# Patient Record
Sex: FEMALE | Race: WHITE | NOT HISPANIC OR LATINO
[De-identification: names, ages, dates, MRNs, and addresses within clinical notes are randomized per-mention and may not be internally consistent; named-entity substitution may affect disease eponyms.]

---

## 2018-12-02 ENCOUNTER — FORM ENCOUNTER (OUTPATIENT)
Age: 28
End: 2018-12-02

## 2019-08-20 ENCOUNTER — FORM ENCOUNTER (OUTPATIENT)
Age: 29
End: 2019-08-20

## 2019-09-26 PROBLEM — Z00.00 ENCOUNTER FOR PREVENTIVE HEALTH EXAMINATION: Status: ACTIVE | Noted: 2019-09-26

## 2019-09-30 ENCOUNTER — APPOINTMENT (OUTPATIENT)
Dept: ANTEPARTUM | Facility: CLINIC | Age: 29
End: 2019-09-30

## 2019-10-01 ENCOUNTER — LABORATORY RESULT (OUTPATIENT)
Age: 29
End: 2019-10-01

## 2019-10-02 ENCOUNTER — APPOINTMENT (OUTPATIENT)
Dept: MATERNAL FETAL MEDICINE | Facility: CLINIC | Age: 29
End: 2019-10-02
Payer: COMMERCIAL

## 2019-10-02 ENCOUNTER — ASOB RESULT (OUTPATIENT)
Age: 29
End: 2019-10-02

## 2019-10-02 ENCOUNTER — APPOINTMENT (OUTPATIENT)
Dept: ANTEPARTUM | Facility: CLINIC | Age: 29
End: 2019-10-02
Payer: COMMERCIAL

## 2019-10-02 ENCOUNTER — APPOINTMENT (OUTPATIENT)
Dept: ANTEPARTUM | Facility: CLINIC | Age: 29
End: 2019-10-02

## 2019-10-02 PROCEDURE — 36416 COLLJ CAPILLARY BLOOD SPEC: CPT

## 2019-10-02 PROCEDURE — 76813 OB US NUCHAL MEAS 1 GEST: CPT

## 2019-10-02 PROCEDURE — 99241 OFFICE CONSULTATION NEW/ESTAB PATIENT 15 MIN: CPT

## 2019-10-14 LAB
1ST TRIMESTER DATA: NORMAL
ADDENDUM DOC: NORMAL
AFP PNL SERPL: NORMAL
AFP SERPL-ACNC: NORMAL
CLINICAL BIOCHEMIST REVIEW: NORMAL
FREE BETA HCG 1ST TRIMESTER: NORMAL
Lab: NORMAL
NASAL BONE: PRESENT
NOTES NTD: NORMAL
NT: NORMAL
PAPP-A SERPL-ACNC: NORMAL
TRISOMY 18/3: NORMAL

## 2019-10-29 ENCOUNTER — APPOINTMENT (OUTPATIENT)
Dept: ANTEPARTUM | Facility: CLINIC | Age: 29
End: 2019-10-29

## 2019-11-01 LAB
1ST TRIMESTER DATA: NORMAL
2ND TRIMESTER DATA: NORMAL
AFP PNL SERPL: NORMAL
AFP SERPL-ACNC: NORMAL
AFP SERPL-ACNC: NORMAL
B-HCG FREE SERPL-MCNC: NORMAL
CLINICAL BIOCHEMIST REVIEW: NORMAL
FREE BETA HCG 1ST TRIMESTER: NORMAL
INHIBIN A SERPL-MCNC: NORMAL
NASAL BONE: PRESENT
NOTES NTD: NORMAL
NT: NORMAL
PAPP-A SERPL-ACNC: NORMAL
U ESTRIOL SERPL-SCNC: NORMAL

## 2019-11-27 ENCOUNTER — APPOINTMENT (OUTPATIENT)
Dept: ANTEPARTUM | Facility: CLINIC | Age: 29
End: 2019-11-27
Payer: COMMERCIAL

## 2019-11-27 ENCOUNTER — ASOB RESULT (OUTPATIENT)
Age: 29
End: 2019-11-27

## 2019-11-27 PROCEDURE — 76817 TRANSVAGINAL US OBSTETRIC: CPT

## 2019-11-27 PROCEDURE — 76805 OB US >/= 14 WKS SNGL FETUS: CPT

## 2020-02-20 ENCOUNTER — APPOINTMENT (OUTPATIENT)
Dept: ANTEPARTUM | Facility: CLINIC | Age: 30
End: 2020-02-20
Payer: COMMERCIAL

## 2020-02-20 ENCOUNTER — ASOB RESULT (OUTPATIENT)
Age: 30
End: 2020-02-20

## 2020-02-20 PROCEDURE — 76819 FETAL BIOPHYS PROFIL W/O NST: CPT

## 2020-02-20 PROCEDURE — 76816 OB US FOLLOW-UP PER FETUS: CPT

## 2020-03-11 ENCOUNTER — TRANSCRIPTION ENCOUNTER (OUTPATIENT)
Age: 30
End: 2020-03-11

## 2020-03-19 ENCOUNTER — ASOB RESULT (OUTPATIENT)
Age: 30
End: 2020-03-19

## 2020-03-19 ENCOUNTER — APPOINTMENT (OUTPATIENT)
Dept: ANTEPARTUM | Facility: CLINIC | Age: 30
End: 2020-03-19
Payer: COMMERCIAL

## 2020-03-19 PROCEDURE — 76816 OB US FOLLOW-UP PER FETUS: CPT

## 2020-03-19 PROCEDURE — 76819 FETAL BIOPHYS PROFIL W/O NST: CPT

## 2020-08-25 ENCOUNTER — FORM ENCOUNTER (OUTPATIENT)
Age: 30
End: 2020-08-25

## 2021-11-16 ENCOUNTER — NON-APPOINTMENT (OUTPATIENT)
Age: 31
End: 2021-11-16

## 2021-11-16 DIAGNOSIS — Z87.898 PERSONAL HISTORY OF OTHER SPECIFIED CONDITIONS: ICD-10-CM

## 2021-11-16 DIAGNOSIS — N92.5 OTHER SPECIFIED IRREGULAR MENSTRUATION: ICD-10-CM

## 2021-11-16 DIAGNOSIS — Z78.9 OTHER SPECIFIED HEALTH STATUS: ICD-10-CM

## 2021-11-16 DIAGNOSIS — Z80.0 FAMILY HISTORY OF MALIGNANT NEOPLASM OF DIGESTIVE ORGANS: ICD-10-CM

## 2021-11-16 DIAGNOSIS — Z80.3 FAMILY HISTORY OF MALIGNANT NEOPLASM OF BREAST: ICD-10-CM

## 2021-11-17 ENCOUNTER — APPOINTMENT (OUTPATIENT)
Dept: OBGYN | Facility: CLINIC | Age: 31
End: 2021-11-17

## 2021-11-28 ENCOUNTER — LABORATORY RESULT (OUTPATIENT)
Age: 31
End: 2021-11-28

## 2021-11-29 ENCOUNTER — APPOINTMENT (OUTPATIENT)
Dept: OBGYN | Facility: CLINIC | Age: 31
End: 2021-11-29
Payer: COMMERCIAL

## 2021-11-29 ENCOUNTER — LABORATORY RESULT (OUTPATIENT)
Age: 31
End: 2021-11-29

## 2021-11-29 ENCOUNTER — APPOINTMENT (OUTPATIENT)
Dept: OBGYN | Facility: CLINIC | Age: 31
End: 2021-11-29

## 2021-11-29 VITALS
HEIGHT: 66 IN | DIASTOLIC BLOOD PRESSURE: 72 MMHG | SYSTOLIC BLOOD PRESSURE: 114 MMHG | BODY MASS INDEX: 27 KG/M2 | WEIGHT: 168 LBS

## 2021-11-29 PROCEDURE — 76817 TRANSVAGINAL US OBSTETRIC: CPT

## 2021-11-29 PROCEDURE — 99214 OFFICE O/P EST MOD 30 MIN: CPT | Mod: 25

## 2021-11-29 NOTE — PHYSICAL EXAM
[Chaperone Present] : A chaperone was present in the examining room during all aspects of the physical examination [FreeTextEntry1] : SM [Labia Majora] : normal [Labia Minora] : normal [Normal] : normal [Enlarged ___ wks] : enlarged [unfilled] ~Uweeks [Anteversion] : anteverted [Mass ___ cm] : no uterine mass was palpated [Uterine Adnexae] : normal [FreeTextEntry6] : Uterus enlarged 6 weeks size

## 2021-11-29 NOTE — PLAN
[FreeTextEntry1] : Patient will start prenatal vitamin nutrition exercise counseling given will discuss with MFM use of clobetasol during pregnancy for now will use 1% hydrocortisone all risk benefits pros cons discussed with patient in layman's terms questions answered we will start prenatal vitamin and routine blood work has been ordered Pap smear done today she denies morning sickness recommend adequate hydration return in 3 to 4 weeks time Ultra-Screen scheduled return for Ultra-Screen MSAFP on level 2 sonogram as instructed

## 2021-12-01 ENCOUNTER — NON-APPOINTMENT (OUTPATIENT)
Age: 31
End: 2021-12-01

## 2021-12-01 NOTE — HISTORY OF PRESENT ILLNESS
[Patient reported PAP Smear was normal] : Patient reported PAP Smear was normal [HIV test declined] : HIV test declined [Syphilis test declined] : Syphilis test declined [Gonorrhea test declined] : Gonorrhea test declined [Chlamydia test declined] : Chlamydia test declined [Trichomonas test declined] : Trichomonas test declined [HPV test declined] : HPV test declined [Hepatitis B test declined] : Hepatitis B test declined [Hepatitis C test declined] : Hepatitis C test declined [N] : Patient does not use contraception [Y] : Positive pregnancy history [Normal Amount/Duration] :  normal amount and duration [Regular Cycle Intervals] : periods have been regular [HCG+: ___(Date)] : a positive HCG was recorded on [unfilled] [Yes] : Patient has concerns regarding sex [Currently Active] : currently active [Men] : men [Vaginal] : vaginal [No] : No [Patient refuses STI testing] : Patient refuses STI testing [MensesAmount] : MODERATE [TextBox_4] : PT HERE FOR ANNUAL AND TO CONFIRM  PREGNANCY\par LMP:10/15/2021\par GA:6W3D\par JUAN LUIS:07/22/2022 [PapSmeardate] : 08/26/20 [LMPDate] : 10/15/21 [MensesFreq] : 28 [MensesLength] : 4 [PGxTotal] : 1 [[facility]] : 1 [[facility] Corporationiving] : 1 [TextBox_6] : 10/15/21 [TextBox_9] : 13 [TextBox_13] : 28 [TextBox_15] : 4-5 [FreeTextEntry1] : 10/15/21

## 2021-12-01 NOTE — HISTORY OF PRESENT ILLNESS
[Patient reported PAP Smear was normal] : Patient reported PAP Smear was normal [HIV test declined] : HIV test declined [Syphilis test declined] : Syphilis test declined [Gonorrhea test declined] : Gonorrhea test declined [Chlamydia test declined] : Chlamydia test declined [Trichomonas test declined] : Trichomonas test declined [HPV test declined] : HPV test declined [Hepatitis B test declined] : Hepatitis B test declined [Hepatitis C test declined] : Hepatitis C test declined [N] : Patient does not use contraception [Y] : Positive pregnancy history [Normal Amount/Duration] :  normal amount and duration [Regular Cycle Intervals] : periods have been regular [HCG+: ___(Date)] : a positive HCG was recorded on [unfilled] [Yes] : Patient has concerns regarding sex [Currently Active] : currently active [Men] : men [Vaginal] : vaginal [No] : No [Patient refuses STI testing] : Patient refuses STI testing [MensesAmount] : MODERATE [TextBox_4] : PT HERE FOR ANNUAL AND TO CONFIRM  PREGNANCY\par LMP:10/15/2021\par GA:6W3D\par JUAN LUIS:07/22/2022 [PapSmeardate] : 08/26/20 [LMPDate] : 10/15/21 [MensesFreq] : 28 [MensesLength] : 4 [PGxTotal] : 1 [[facility] CenterxFulerm] : 1 [[facility] [TextBox_6] : 10/15/21 [TextBox_9] : 13 [TextBox_13] : 28 [TextBox_15] : 4-5 [FreeTextEntry1] : 10/15/21

## 2021-12-03 LAB — CYTOLOGY CVX/VAG DOC THIN PREP: NORMAL

## 2021-12-13 ENCOUNTER — NON-APPOINTMENT (OUTPATIENT)
Age: 31
End: 2021-12-13

## 2021-12-14 LAB
ABO + RH PNL BLD: NORMAL
APPEARANCE: CLEAR
BASOPHILS # BLD AUTO: 0.06 K/UL
BASOPHILS NFR BLD AUTO: 0.8 %
BILIRUBIN URINE: NEGATIVE
BLOOD URINE: NEGATIVE
COLOR: YELLOW
EOSINOPHIL # BLD AUTO: 0.2 K/UL
EOSINOPHIL NFR BLD AUTO: 2.8 %
GLUCOSE BS SERPL-MCNC: 80 MG/DL
GLUCOSE QUALITATIVE U: NEGATIVE
HBV SURFACE AG SER QL: NONREACTIVE
HCT VFR BLD CALC: 39.6 %
HCV AB SER QL: NONREACTIVE
HCV S/CO RATIO: 0.41 S/CO
HGB A MFR BLD: 97.6 %
HGB A2 MFR BLD: 2.4 %
HGB BLD-MCNC: 11.9 G/DL
HGB FRACT BLD-IMP: NORMAL
IMM GRANULOCYTES NFR BLD AUTO: 0.3 %
KETONES URINE: NEGATIVE
LEUKOCYTE ESTERASE URINE: NEGATIVE
LYMPHOCYTES # BLD AUTO: 1.11 K/UL
LYMPHOCYTES NFR BLD AUTO: 15.5 %
MAN DIFF?: NORMAL
MCHC RBC-ENTMCNC: 27.1 PG
MCHC RBC-ENTMCNC: 30.1 GM/DL
MCV RBC AUTO: 90.2 FL
MEV IGG FLD QL IA: 24.3 AU/ML
MEV IGG+IGM SER-IMP: POSITIVE
MONOCYTES # BLD AUTO: 0.54 K/UL
MONOCYTES NFR BLD AUTO: 7.5 %
NEUTROPHILS # BLD AUTO: 5.25 K/UL
NEUTROPHILS NFR BLD AUTO: 73.1 %
NITRITE URINE: NEGATIVE
PH URINE: 6.5
PLATELET # BLD AUTO: 283 K/UL
PROTEIN URINE: NORMAL
RBC # BLD: 4.39 M/UL
RBC # FLD: 16.1 %
RUBV IGG FLD-ACNC: 16.6 INDEX
RUBV IGG SER-IMP: POSITIVE
SPECIFIC GRAVITY URINE: 1.03
T3FREE SERPL-MCNC: 3.01 PG/ML
T4 FREE SERPL-MCNC: 1.1 NG/DL
TSH SERPL-ACNC: 1.61 UIU/ML
UROBILINOGEN URINE: NORMAL
VZV AB TITR SER: POSITIVE
VZV IGG SER IF-ACNC: 709.9 INDEX
WBC # FLD AUTO: 7.18 K/UL

## 2021-12-15 LAB
RPR SER-TITR: NORMAL
RUBV IGM FLD-ACNC: <20 AU/ML

## 2021-12-16 ENCOUNTER — NON-APPOINTMENT (OUTPATIENT)
Age: 31
End: 2021-12-16

## 2021-12-17 ENCOUNTER — NON-APPOINTMENT (OUTPATIENT)
Age: 31
End: 2021-12-17

## 2021-12-17 LAB
B19V IGG SER QL IA: 6.17 INDEX
B19V IGG+IGM SER-IMP: NORMAL
B19V IGG+IGM SER-IMP: POSITIVE
B19V IGM FLD-ACNC: 0.2 INDEX
B19V IGM SER-ACNC: NEGATIVE
MEV IGM SER QL: 1.14 ISR
VZV IGM SER IF-ACNC: 1.09 INDEX

## 2021-12-20 ENCOUNTER — NON-APPOINTMENT (OUTPATIENT)
Age: 31
End: 2021-12-20

## 2021-12-21 LAB — AR GENE MUT ANL BLD/T: NORMAL

## 2021-12-27 LAB — CFTR MUT TESTED BLD/T: NEGATIVE

## 2021-12-29 ENCOUNTER — ASOB RESULT (OUTPATIENT)
Age: 31
End: 2021-12-29

## 2021-12-29 ENCOUNTER — APPOINTMENT (OUTPATIENT)
Dept: MATERNAL FETAL MEDICINE | Facility: CLINIC | Age: 31
End: 2021-12-29
Payer: COMMERCIAL

## 2021-12-29 ENCOUNTER — NON-APPOINTMENT (OUTPATIENT)
Age: 31
End: 2021-12-29

## 2021-12-29 PROCEDURE — 99202 OFFICE O/P NEW SF 15 MIN: CPT | Mod: 95

## 2021-12-30 ENCOUNTER — NON-APPOINTMENT (OUTPATIENT)
Age: 31
End: 2021-12-30

## 2022-01-03 ENCOUNTER — NON-APPOINTMENT (OUTPATIENT)
Age: 32
End: 2022-01-03

## 2022-01-03 ENCOUNTER — APPOINTMENT (OUTPATIENT)
Dept: OBGYN | Facility: CLINIC | Age: 32
End: 2022-01-03
Payer: COMMERCIAL

## 2022-01-03 LAB
11-BETA-HYDROXYLASE-DEFICIENT CONGENITAL ADRENAL HYPERPLASIA: NEGATIVE
6-PYRUVOYL-TETRAHYDROPTERIN SYNTHASE DEFICIENCY: NEGATIVE
ABCC8-RELATED HYPERINSULINISM: NEGATIVE
ADENOSINE DEAMINASE DEFICIENCY: NEGATIVE
ALPHA THALASSEMIA: NEGATIVE
ALPHA-MANNOSIDOSIS: NEGATIVE
ALSTROM SYNDROME: NEGATIVE
AMT-RELATED GLYCINE ENCEPHALOPATHY: NEGATIVE
ANDERMANN SYNDROME: NEGATIVE
AR GENE MUT ANL BLD/T: NEGATIVE
ARGININEMIA: NEGATIVE
ARGININOSUCCINIC ACIDURIA: NEGATIVE
ARSACS: NEGATIVE
ASPARTYLGLYCOSAMINURIA: NEGATIVE
ATAXIA WITH VITAMIN E DEFICIENCY: NEGATIVE
ATAXIA-TELANGIECTASIA: NEGATIVE
ATP7A-RELATED DISORDERS: NEGATIVE
AUTOSOMAL RECESSIVE OSTEOPETROSIS TYPE 1: NEGATIVE
AUTOSOMAL RECESSIVE POLYCYSTIC KIDNEY DISEASE: NEGATIVE
BARDET-BIEDL SYNDROME, BBS1-RELATED: NEGATIVE
BARDET-BIEDL SYNDROME, BBS10-RELATED: NEGATIVE
BARDET-BIEDL SYNDROME, BBS12-RELATED: POSITIVE
BARDET-BIEDL SYNDROME, BBS2-RELATED: NEGATIVE
BIOTINIDASE DEFICIENCY: NEGATIVE
BLOOM SYNDROME: NEGATIVE
CALPAINOPATHY: NEGATIVE
CANAVAN DISEASE: NEGATIVE
CARBAMOYLPHOSPHATE SYNTHETASE I DEFICIENCY: NEGATIVE
CARNITINE PALMITOYLTRANSFERASE IA DEFICIENCY: NEGATIVE
CARNITINE PALMITOYLTRANSFERASE II DEFICIENCY: NEGATIVE
CARTILAGE-HAIR HYPOPLASIA: NEGATIVE
CEREBROTENDINOUS XANTHOMATOSIS: NEGATIVE
CITRULLINEMIA TYPE 1: NEGATIVE
CLN3-RELATED NEURONAL CEROID LIPOFUSCINOSIS: NEGATIVE
CLN5-RELATED NEURONAL CEROID LIPOFUSCINOSIS: NEGATIVE
CLN6-RELATED NEURONAL CEROID LIPOFUSCINOSIS: NEGATIVE
COHEN SYNDROME: NEGATIVE
COL4A3-RELATED ALPORT SYNDROME: NEGATIVE
COL4A4-RELATED ALPORT SYNDROME: NEGATIVE
CONGENITAL ADRENAL HYPERPLASIA: NEGATIVE
CONGENITAL DISORDER OF GLYCOSYLATION TYPE IA: NEGATIVE
CONGENITAL DISORDER OF GLYCOSYLATION TYPE IB: NEGATIVE
CONGENITAL DISORDER OF GLYCOSYLATION TYPE IC: NEGATIVE
CONGENITAL FINNISH NEPHROSIS: NEGATIVE
COSTEFF OPTIC ATROPHY SYNDROME: NEGATIVE
CYSTIC FIBROSIS: NEGATIVE
CYSTINOSIS: NEGATIVE
D-BIFUNCTIONAL PROTEIN DEFICIENCY: NEGATIVE
DELTA-SARCOGLYCANOPATHY: NEGATIVE
DYS GENE MUT TESTED BLD/T: NEGATIVE
DYSFERLINOPATHY: NEGATIVE
DYSTROPHINOPATHY (INCLUDING DUCHENNE/BECKER MUSCULAR DYSTROP: NEGATIVE
ERCC6-RELATED DISORDERS: NEGATIVE
ERCC8-RELATED DISORDERS: NEGATIVE
EVC-RELATED ELLIS-VAN CREVELD SYNDROME: NEGATIVE
EVC2-RELATED ELLIS-VAN CREVELD SYNDROME: NEGATIVE
FABRY DISEASE: NEGATIVE
FAMILIAL MEDITERRANEAN FEVER: NEGATIVE
FANCONI ANEMIA COMPLEMENTATION GROUP A: NEGATIVE
FANCONI ANEMIA TYPE C: NEGATIVE
FKRP-RELATED DISORDERS: NEGATIVE
FRAGILE X SYNDROME: NEGATIVE
GALACTOKINASE DEFICIENCY: NEGATIVE
GALACTOSEMIA: NEGATIVE
GAMMA-SARCOGLYCANOPATHY: NEGATIVE
GAUCHER DISEASE: NEGATIVE
GJB2-RELATED DFNB1 NONSYNDROMIC HEARING LOSS AND DEAFNESS: NEGATIVE
GLB1-RELATED DISORDERS: NEGATIVE
GLDC-RELATED GLYCINE ENCEPHALOPATHY: NEGATIVE
GLUTARIC ACIDEMIA TYPE 1: NEGATIVE
GLYCOGEN STORAGE DISEASE TYPE IA: NEGATIVE
GLYCOGEN STORAGE DISEASE TYPE IB: NEGATIVE
GLYCOGEN STORAGE DISEASE TYPE III: NEGATIVE
GNPTAB-RELATED DISORDERS: NEGATIVE
GRACILE SYNDROME: NEGATIVE
HB BETA CHAIN-RELATED HEMOGLOBINOPATHY: NEGATIVE
HEREDITARY FRUCTOSE INTOLERANCE: NEGATIVE
HERLITZ JUNCTIONAL EPIDERMOLYSIS BULLOSA, LAMA3-RELATED: NEGATIVE
HERLITZ JUNCTIONAL EPIDERMOLYSIS BULLOSA, LAMB3-RELATED: NEGATIVE
HERLITZ JUNCTIONAL EPIDERMOLYSIS BULLOSA, LAMC2-RELATED: NEGATIVE
HEXOSAMINIDASE A DEFICIENCY: NEGATIVE
HMG-COA LYASE DEFICIENCY: NEGATIVE
HOLOCARBOXYLASE SYNTHETASE DEFICIENCY: NEGATIVE
HOMOCYSTINURIA / CYSTATHIONINE BETA-SYNTHASE DEFICIENCY: NEGATIVE
HURLER SYNDROME: NEGATIVE
HYDROLETHALUS SYNDROME: NEGATIVE
HYPOPHOSPHATASIA, AUTOSOMAL RECESSIVE: NEGATIVE
INCLUSION BODY MYOPATHY 2: NEGATIVE
ISOVALERIC ACIDEMIA: NEGATIVE
JOUBERT SYNDROME 2: NEGATIVE
KCNJ11-RELATED FAMILIAL HYPERINSULINISM: NEGATIVE
KRABBE DISEASE: NEGATIVE
LAMA2-RELATED MUSCULAR DYSTROPHY: NEGATIVE
LEIGH SYNDROME, FRENCH-CANADIAN TYPE: NEGATIVE
LIMB-GIRDLE MUSCULAR DYSTROPHY TYPE 2D: NEGATIVE
LIMB-GIRDLE MUSCULAR DYSTROPHY TYPE 2E: NEGATIVE
LIPOAMIDE DEHYDROGENASE DEFICIENCY: NEGATIVE
LIPOID CONGENITAL ADRENAL HYPERPLASIA: NEGATIVE
LONG CHAIN 3-HYDROXYACYL-COA DEHYDROGENASE DEFICIENCY: NEGATIVE
LYSOSOMAL ACID LIPASE DEFICIENCY: NEGATIVE
MAPLE SYRUP URINE DISEASE TYPE 1B: NEGATIVE
MAPLE SYRUP URINE DISEASE TYPE IA: NEGATIVE
MAPLE SYRUP URINE DISEASE TYPE II: NEGATIVE
MEDIUM CHAIN ACYL-COA DEHYDROGENASE DEFICIENCY: NEGATIVE
MEGALENCEPHALIC LEUKOENCEPHALOPATHY WITH SUBCORTICAL CYSTS: NEGATIVE
METACHROMATIC LEUKODYSTROPHY: NEGATIVE
METHYLMALONIC ACIDEMIA, CBLA TYPE: NEGATIVE
METHYLMALONIC ACIDEMIA, CBLB TYPE: NEGATIVE
METHYLMALONIC ACIDURIA AND HOMOCYSTINURIA, CBLC TYPE: NEGATIVE
MKS1-RELATED DISORDERS: NEGATIVE
MUCOLIPIDOSIS III GAMMA: NEGATIVE
MUCOLIPIDOSIS IV: NEGATIVE
MUCOPOLYSACCHARIDOSIS TYPE II: NEGATIVE
MUCOPOLYSACCHARIDOSIS TYPE IIIA: NEGATIVE
MUCOPOLYSACCHARIDOSIS TYPE IIIB: NEGATIVE
MUCOPOLYSACCHARIDOSIS TYPE IIIC: NEGATIVE
MUSCLE-EYE-BRAIN DISEASE: NEGATIVE
MUT-RELATED METHYLMALONIC ACIDEMIA: NEGATIVE
MYO7A-RELATED DISORDERS: NEGATIVE
NEB-RELATED NEMALINE MYOPATHY: NEGATIVE
NIEMANN-PICK DISEASE TYPE C2: NEGATIVE
NIEMANN-PICK DISEASE TYPE C: NEGATIVE
NIEMANN-PICK DISEASE, SMPD1-ASSOCIATED: NEGATIVE
NIJMEGEN BREAKAGE SYNDROME: NEGATIVE
NORTHERN EPILEPSY: NEGATIVE
ORNITHINE TRANSCARBAMYLASE DEFICIENCY: NEGATIVE
PCCA-RELATED PROPIONIC ACIDEMIA: NEGATIVE
PCCB-RELATED PROPIONIC ACIDEMIA: NEGATIVE
PENDRED SYNDROME: NEGATIVE
PEROXISOME BIOGENESIS DISORDER TYPE 3: NEGATIVE
PEROXISOME BIOGENESIS DISORDER TYPE 4: NEGATIVE
PEROXISOME BIOGENESIS DISORDER TYPE 5: NEGATIVE
PEROXISOME BIOGENESIS DISORDER TYPE 6: NEGATIVE
PEX1-RELATED ZELLWEGER SYNDROME SPECTRUM: NEGATIVE
PHENYLALANINE HYDROXYLASE DEFICIENCY: NEGATIVE
POLYGLANDULAR AUTOIMMUNE SYNDROME TYPE 1: NEGATIVE
POMPE DISEASE: NEGATIVE
PPT1-RELATED NEURONAL CEROID LIPOFUSCINOSIS: NEGATIVE
PRIMARY CARNITINE DEFICIENCY: NEGATIVE
PRIMARY HYPEROXALURIA TYPE 1: NEGATIVE
PRIMARY HYPEROXALURIA TYPE 2: NEGATIVE
PRIMARY HYPEROXALURIA TYPE 3: NEGATIVE
PROP1-RELATED COMBINED PITUITARY HORMONE DEFICIENCY: NEGATIVE
PYCNODYSOSTOSIS: NEGATIVE
PYRUVATE CARBOXYLASE DEFICIENCY: NEGATIVE
RHIZOMELIC CHONDRODYSPLASIA PUNCTATA TYPE 1: NEGATIVE
RTEL1-RELATED DISORDERS: NEGATIVE
SALLA DISEASE: NEGATIVE
SANDHOFF DISEASE: NEGATIVE
SEGAWA SYNDROME: NEGATIVE
SHORT CHAIN ACYL-COA DEHYDROGENASE DEFICIENCY: NEGATIVE
SJOGREN-LARSSON SYNDROME: NEGATIVE
SMITH-LEMLI-OPITZ SYNDROME: NEGATIVE
SPASTIC PARAPLEGIA TYPE 15: NEGATIVE
SPONDYLOTHORACIC DYSOSTOSIS: NEGATIVE
STEROID-RESISTANT NEPHROTIC SYNDROME: NEGATIVE
SULFATE TRANSPORTER-RELATED OSTEOCHONDRODYSPLASIA: NEGATIVE
TGM1-RELATED AUTOSOMAL RECESSIVE CONGENITAL ICHTHYOSIS: NEGATIVE
TPP1-RELATED NEURONAL CEROID LIPOFUSCINOSIS: NEGATIVE
TYROSINEMIA TYPE I: NEGATIVE
TYROSINEMIA TYPE II: NEGATIVE
USH1C-RELATED DISORDERS: NEGATIVE
USH2A-RELATED DISORDERS: NEGATIVE
USHER SYNDROME TYPE 1F: NEGATIVE
USHER SYNDROME TYPE 3: NEGATIVE
VERY LONG CHAIN ACYL-COA DEHYDROGENASE DEFICIENCY: NEGATIVE
WALKER-WARBURG SYNDROME: NEGATIVE
WILSON DISEASE: NEGATIVE
X-LINKED ADRENOLEUKODYSTROPHY: NEGATIVE
X-LINKED ALPORT SYNDROME: NEGATIVE
X-LINKED CONGENITAL ADRENAL HYPOPLASIA: NEGATIVE
X-LINKED JUVENILE RETINOSCHISIS: NEGATIVE
X-LINKED MYOTUBULAR MYOPATHY: NEGATIVE
X-LINKED SEVERE COMBINED IMMUNODEFICIENCY: NEGATIVE
XERODERMA PIGMENTOSUM GROUP A: NEGATIVE
XERODERMA PIGMENTOSUM GROUP C: NEGATIVE

## 2022-01-03 PROCEDURE — 0501F PRENATAL FLOW SHEET: CPT

## 2022-01-03 RX ORDER — PRENATAL VIT 49/IRON FUM/FOLIC 6.75-0.2MG
TABLET ORAL
Refills: 0 | Status: ACTIVE | COMMUNITY

## 2022-01-12 ENCOUNTER — ASOB RESULT (OUTPATIENT)
Age: 32
End: 2022-01-12

## 2022-01-12 ENCOUNTER — APPOINTMENT (OUTPATIENT)
Dept: ANTEPARTUM | Facility: CLINIC | Age: 32
End: 2022-01-12
Payer: COMMERCIAL

## 2022-01-12 ENCOUNTER — NON-APPOINTMENT (OUTPATIENT)
Age: 32
End: 2022-01-12

## 2022-01-12 PROCEDURE — 36416 COLLJ CAPILLARY BLOOD SPEC: CPT

## 2022-01-12 PROCEDURE — ZZZZZ: CPT

## 2022-01-12 PROCEDURE — 76813 OB US NUCHAL MEAS 1 GEST: CPT

## 2022-01-17 LAB
1ST TRIMESTER DATA: NORMAL
ADDENDUM DOC: NORMAL
AFP PNL SERPL: NORMAL
AFP SERPL-ACNC: NORMAL
CLINICAL BIOCHEMIST REVIEW: NORMAL
FREE BETA HCG 1ST TRIMESTER: NORMAL
Lab: NORMAL
NOTES NTD: NORMAL
NT: NORMAL
PAPP-A SERPL-ACNC: NORMAL
TRISOMY 18/3: NORMAL

## 2022-02-01 ENCOUNTER — NON-APPOINTMENT (OUTPATIENT)
Age: 32
End: 2022-02-01

## 2022-02-02 ENCOUNTER — APPOINTMENT (OUTPATIENT)
Dept: OBGYN | Facility: CLINIC | Age: 32
End: 2022-02-02
Payer: COMMERCIAL

## 2022-02-02 ENCOUNTER — NON-APPOINTMENT (OUTPATIENT)
Age: 32
End: 2022-02-02

## 2022-02-02 PROCEDURE — 0502F SUBSEQUENT PRENATAL CARE: CPT

## 2022-02-08 ENCOUNTER — NON-APPOINTMENT (OUTPATIENT)
Age: 32
End: 2022-02-08

## 2022-03-03 ENCOUNTER — APPOINTMENT (OUTPATIENT)
Dept: ANTEPARTUM | Facility: CLINIC | Age: 32
End: 2022-03-03
Payer: COMMERCIAL

## 2022-03-03 ENCOUNTER — ASOB RESULT (OUTPATIENT)
Age: 32
End: 2022-03-03

## 2022-03-03 PROCEDURE — 76817 TRANSVAGINAL US OBSTETRIC: CPT

## 2022-03-03 PROCEDURE — 76805 OB US >/= 14 WKS SNGL FETUS: CPT | Mod: 59

## 2022-03-08 ENCOUNTER — NON-APPOINTMENT (OUTPATIENT)
Age: 32
End: 2022-03-08

## 2022-03-09 ENCOUNTER — NON-APPOINTMENT (OUTPATIENT)
Age: 32
End: 2022-03-09

## 2022-03-09 ENCOUNTER — APPOINTMENT (OUTPATIENT)
Dept: OBGYN | Facility: CLINIC | Age: 32
End: 2022-03-09
Payer: COMMERCIAL

## 2022-03-09 LAB
GLUCOSE UR-MCNC: NORMAL
PROT UR STRIP-MCNC: NORMAL

## 2022-03-09 PROCEDURE — 0502F SUBSEQUENT PRENATAL CARE: CPT

## 2022-03-31 ENCOUNTER — NON-APPOINTMENT (OUTPATIENT)
Age: 32
End: 2022-03-31

## 2022-04-04 ENCOUNTER — APPOINTMENT (OUTPATIENT)
Dept: OBGYN | Facility: CLINIC | Age: 32
End: 2022-04-04
Payer: COMMERCIAL

## 2022-04-04 PROCEDURE — 0502F SUBSEQUENT PRENATAL CARE: CPT

## 2022-05-04 ENCOUNTER — APPOINTMENT (OUTPATIENT)
Dept: ANTEPARTUM | Facility: CLINIC | Age: 32
End: 2022-05-04
Payer: COMMERCIAL

## 2022-05-04 ENCOUNTER — ASOB RESULT (OUTPATIENT)
Age: 32
End: 2022-05-04

## 2022-05-04 PROCEDURE — 76817 TRANSVAGINAL US OBSTETRIC: CPT

## 2022-05-04 PROCEDURE — 76816 OB US FOLLOW-UP PER FETUS: CPT

## 2022-05-05 ENCOUNTER — NON-APPOINTMENT (OUTPATIENT)
Age: 32
End: 2022-05-05

## 2022-05-05 ENCOUNTER — TRANSCRIPTION ENCOUNTER (OUTPATIENT)
Age: 32
End: 2022-05-05

## 2022-05-05 DIAGNOSIS — Q04.8 OTHER SPECIFIED CONGENITAL MALFORMATIONS OF BRAIN: ICD-10-CM

## 2022-05-05 LAB
BASOPHILS # BLD AUTO: 0.04 K/UL
BASOPHILS NFR BLD AUTO: 0.6 %
EOSINOPHIL # BLD AUTO: 0.17 K/UL
EOSINOPHIL NFR BLD AUTO: 2.3 %
GLUCOSE 1H P 50 G GLC PO SERPL-MCNC: 88 MG/DL
HCT VFR BLD CALC: 36.3 %
HGB BLD-MCNC: 11.5 G/DL
IMM GRANULOCYTES NFR BLD AUTO: 0.4 %
LYMPHOCYTES # BLD AUTO: 1.53 K/UL
LYMPHOCYTES NFR BLD AUTO: 21 %
MAN DIFF?: NORMAL
MCHC RBC-ENTMCNC: 27.4 PG
MCHC RBC-ENTMCNC: 31.7 GM/DL
MCV RBC AUTO: 86.6 FL
MONOCYTES # BLD AUTO: 0.49 K/UL
MONOCYTES NFR BLD AUTO: 6.7 %
NEUTROPHILS # BLD AUTO: 5.01 K/UL
NEUTROPHILS NFR BLD AUTO: 69 %
PLATELET # BLD AUTO: 252 K/UL
RBC # BLD: 4.19 M/UL
RBC # FLD: 14 %
WBC # FLD AUTO: 7.27 K/UL

## 2022-05-09 ENCOUNTER — APPOINTMENT (OUTPATIENT)
Dept: OBGYN | Facility: CLINIC | Age: 32
End: 2022-05-09
Payer: COMMERCIAL

## 2022-05-09 ENCOUNTER — APPOINTMENT (OUTPATIENT)
Dept: MRI IMAGING | Facility: HOSPITAL | Age: 32
End: 2022-05-09
Payer: COMMERCIAL

## 2022-05-09 ENCOUNTER — OUTPATIENT (OUTPATIENT)
Dept: OUTPATIENT SERVICES | Age: 32
LOS: 1 days | End: 2022-05-09

## 2022-05-09 DIAGNOSIS — Q04.8 OTHER SPECIFIED CONGENITAL MALFORMATIONS OF BRAIN: ICD-10-CM

## 2022-05-09 PROCEDURE — 74712 MRI FETAL SNGL/1ST GESTATION: CPT | Mod: 26

## 2022-05-09 PROCEDURE — 0502F SUBSEQUENT PRENATAL CARE: CPT

## 2022-05-11 LAB
GLUCOSE UR-MCNC: NORMAL
PROT UR STRIP-MCNC: NORMAL

## 2022-05-16 ENCOUNTER — NON-APPOINTMENT (OUTPATIENT)
Age: 32
End: 2022-05-16

## 2022-05-16 LAB
GLUCOSE 1H P 100 G GLC PO SERPL-MCNC: 101 MG/DL
GLUCOSE 2H P CHAL SERPL-MCNC: 79 MG/DL
GLUCOSE 3H P CHAL SERPL-MCNC: 52 MG/DL
GLUCOSE BS SERPL-MCNC: 80 MG/DL

## 2022-05-17 ENCOUNTER — NON-APPOINTMENT (OUTPATIENT)
Age: 32
End: 2022-05-17

## 2022-05-18 ENCOUNTER — NON-APPOINTMENT (OUTPATIENT)
Age: 32
End: 2022-05-18

## 2022-05-18 ENCOUNTER — APPOINTMENT (OUTPATIENT)
Dept: OBGYN | Facility: CLINIC | Age: 32
End: 2022-05-18
Payer: COMMERCIAL

## 2022-05-18 LAB
GLUCOSE UR-MCNC: NEGATIVE
PROT UR STRIP-MCNC: NEGATIVE

## 2022-05-18 PROCEDURE — 0502F SUBSEQUENT PRENATAL CARE: CPT

## 2022-05-18 PROCEDURE — 90715 TDAP VACCINE 7 YRS/> IM: CPT

## 2022-05-18 PROCEDURE — 90471 IMMUNIZATION ADMIN: CPT

## 2022-06-01 ENCOUNTER — ASOB RESULT (OUTPATIENT)
Age: 32
End: 2022-06-01

## 2022-06-01 ENCOUNTER — APPOINTMENT (OUTPATIENT)
Dept: ANTEPARTUM | Facility: CLINIC | Age: 32
End: 2022-06-01
Payer: COMMERCIAL

## 2022-06-01 PROCEDURE — 76816 OB US FOLLOW-UP PER FETUS: CPT

## 2022-06-06 ENCOUNTER — APPOINTMENT (OUTPATIENT)
Dept: OBGYN | Facility: CLINIC | Age: 32
End: 2022-06-06
Payer: COMMERCIAL

## 2022-06-06 ENCOUNTER — NON-APPOINTMENT (OUTPATIENT)
Age: 32
End: 2022-06-06

## 2022-06-06 LAB
BILIRUB UR QL STRIP: NORMAL
COLLECTION METHOD: NORMAL
GLUCOSE UR-MCNC: NORMAL
HCG UR QL: 0.2 EU/DL
HGB UR QL STRIP.AUTO: NORMAL
KETONES UR-MCNC: ABNORMAL
LEUKOCYTE ESTERASE UR QL STRIP: ABNORMAL
NITRITE UR QL STRIP: NORMAL
PH UR STRIP: 7
PROT UR STRIP-MCNC: NORMAL
SP GR UR STRIP: 1.02

## 2022-06-06 PROCEDURE — 0502F SUBSEQUENT PRENATAL CARE: CPT

## 2022-06-15 ENCOUNTER — NON-APPOINTMENT (OUTPATIENT)
Age: 32
End: 2022-06-15

## 2022-06-20 ENCOUNTER — APPOINTMENT (OUTPATIENT)
Dept: OBGYN | Facility: CLINIC | Age: 32
End: 2022-06-20
Payer: COMMERCIAL

## 2022-06-20 LAB
CLARITY UR: CLEAR
COLLECTION METHOD: NORMAL
GLUCOSE UR-MCNC: NEGATIVE
LEUKOCYTE ESTERASE UR QL STRIP: NEGATIVE
NITRITE UR QL STRIP: NEGATIVE
PROT UR STRIP-MCNC: NEGATIVE

## 2022-06-20 PROCEDURE — 0502F SUBSEQUENT PRENATAL CARE: CPT

## 2022-06-22 LAB
GP B STREP DNA SPEC QL NAA+PROBE: NORMAL
GP B STREP DNA SPEC QL NAA+PROBE: NOT DETECTED
SOURCE GBS: NORMAL

## 2022-06-27 ENCOUNTER — ASOB RESULT (OUTPATIENT)
Age: 32
End: 2022-06-27

## 2022-06-27 ENCOUNTER — APPOINTMENT (OUTPATIENT)
Dept: ANTEPARTUM | Facility: CLINIC | Age: 32
End: 2022-06-27
Payer: COMMERCIAL

## 2022-06-27 PROCEDURE — 76819 FETAL BIOPHYS PROFIL W/O NST: CPT

## 2022-06-27 PROCEDURE — 76816 OB US FOLLOW-UP PER FETUS: CPT

## 2022-06-29 ENCOUNTER — NON-APPOINTMENT (OUTPATIENT)
Age: 32
End: 2022-06-29

## 2022-07-06 ENCOUNTER — NON-APPOINTMENT (OUTPATIENT)
Age: 32
End: 2022-07-06

## 2022-07-06 ENCOUNTER — APPOINTMENT (OUTPATIENT)
Dept: OBGYN | Facility: CLINIC | Age: 32
End: 2022-07-06

## 2022-07-06 VITALS
BODY MASS INDEX: 31.55 KG/M2 | HEIGHT: 67 IN | WEIGHT: 201 LBS | SYSTOLIC BLOOD PRESSURE: 118 MMHG | DIASTOLIC BLOOD PRESSURE: 66 MMHG

## 2022-07-06 LAB
CLARITY UR: CLEAR
COLLECTION METHOD: NORMAL
GLUCOSE UR-MCNC: NEGATIVE
LEUKOCYTE ESTERASE UR QL STRIP: NORMAL
NITRITE UR QL STRIP: NEGATIVE
PROT UR STRIP-MCNC: NEGATIVE

## 2022-07-06 PROCEDURE — 0502F SUBSEQUENT PRENATAL CARE: CPT

## 2022-07-06 PROCEDURE — 36415 COLL VENOUS BLD VENIPUNCTURE: CPT

## 2022-07-08 LAB — HIV1+2 AB SPEC QL IA.RAPID: NONREACTIVE

## 2022-07-12 ENCOUNTER — NON-APPOINTMENT (OUTPATIENT)
Age: 32
End: 2022-07-12

## 2022-07-13 ENCOUNTER — APPOINTMENT (OUTPATIENT)
Dept: OBGYN | Facility: CLINIC | Age: 32
End: 2022-07-13

## 2022-07-13 VITALS
SYSTOLIC BLOOD PRESSURE: 120 MMHG | WEIGHT: 203 LBS | HEIGHT: 67 IN | HEART RATE: 74 BPM | BODY MASS INDEX: 31.86 KG/M2 | OXYGEN SATURATION: 98 % | DIASTOLIC BLOOD PRESSURE: 72 MMHG

## 2022-07-13 PROCEDURE — 0502F SUBSEQUENT PRENATAL CARE: CPT

## 2022-07-14 ENCOUNTER — NON-APPOINTMENT (OUTPATIENT)
Age: 32
End: 2022-07-14

## 2022-07-18 ENCOUNTER — APPOINTMENT (OUTPATIENT)
Dept: OBGYN | Facility: CLINIC | Age: 32
End: 2022-07-18

## 2022-07-18 VITALS
SYSTOLIC BLOOD PRESSURE: 122 MMHG | DIASTOLIC BLOOD PRESSURE: 78 MMHG | HEIGHT: 67 IN | BODY MASS INDEX: 31.55 KG/M2 | WEIGHT: 201 LBS

## 2022-07-18 PROCEDURE — 0502F SUBSEQUENT PRENATAL CARE: CPT

## 2022-07-21 ENCOUNTER — NON-APPOINTMENT (OUTPATIENT)
Age: 32
End: 2022-07-21

## 2022-07-23 ENCOUNTER — INPATIENT (INPATIENT)
Facility: HOSPITAL | Age: 32
LOS: 1 days | Discharge: HOME | End: 2022-07-25
Attending: OBSTETRICS & GYNECOLOGY | Admitting: OBSTETRICS & GYNECOLOGY
Payer: COMMERCIAL

## 2022-07-23 ENCOUNTER — TRANSCRIPTION ENCOUNTER (OUTPATIENT)
Age: 32
End: 2022-07-23

## 2022-07-23 VITALS — HEART RATE: 101 BPM | SYSTOLIC BLOOD PRESSURE: 132 MMHG | DIASTOLIC BLOOD PRESSURE: 90 MMHG

## 2022-07-23 DIAGNOSIS — O47.1 FALSE LABOR AT OR AFTER 37 COMPLETED WEEKS OF GESTATION: ICD-10-CM

## 2022-07-23 LAB
ALBUMIN SERPL ELPH-MCNC: 3.3 G/DL — SIGNIFICANT CHANGE UP (ref 3.3–5.2)
ALP SERPL-CCNC: 209 U/L — HIGH (ref 40–120)
ALT FLD-CCNC: 15 U/L — SIGNIFICANT CHANGE UP
ANION GAP SERPL CALC-SCNC: 14 MMOL/L — SIGNIFICANT CHANGE UP (ref 5–17)
AST SERPL-CCNC: 24 U/L — SIGNIFICANT CHANGE UP
BASOPHILS # BLD AUTO: 0.05 K/UL — SIGNIFICANT CHANGE UP (ref 0–0.2)
BASOPHILS NFR BLD AUTO: 0.5 % — SIGNIFICANT CHANGE UP (ref 0–2)
BILIRUB SERPL-MCNC: <0.2 MG/DL — LOW (ref 0.4–2)
BLD GP AB SCN SERPL QL: SIGNIFICANT CHANGE UP
BUN SERPL-MCNC: 12.6 MG/DL — SIGNIFICANT CHANGE UP (ref 8–20)
CALCIUM SERPL-MCNC: 8.5 MG/DL — LOW (ref 8.6–10.2)
CHLORIDE SERPL-SCNC: 100 MMOL/L — SIGNIFICANT CHANGE UP (ref 98–107)
CO2 SERPL-SCNC: 18 MMOL/L — LOW (ref 22–29)
CREAT ?TM UR-MCNC: 80 MG/DL — SIGNIFICANT CHANGE UP
CREAT SERPL-MCNC: 0.63 MG/DL — SIGNIFICANT CHANGE UP (ref 0.5–1.3)
EGFR: 121 ML/MIN/1.73M2 — SIGNIFICANT CHANGE UP
EOSINOPHIL # BLD AUTO: 0.1 K/UL — SIGNIFICANT CHANGE UP (ref 0–0.5)
EOSINOPHIL NFR BLD AUTO: 1 % — SIGNIFICANT CHANGE UP (ref 0–6)
GLUCOSE SERPL-MCNC: 68 MG/DL — LOW (ref 70–99)
HCT VFR BLD CALC: 35.9 % — SIGNIFICANT CHANGE UP (ref 34.5–45)
HGB BLD-MCNC: 11.6 G/DL — SIGNIFICANT CHANGE UP (ref 11.5–15.5)
IMM GRANULOCYTES NFR BLD AUTO: 0.5 % — SIGNIFICANT CHANGE UP (ref 0–1.5)
LYMPHOCYTES # BLD AUTO: 1.49 K/UL — SIGNIFICANT CHANGE UP (ref 1–3.3)
LYMPHOCYTES # BLD AUTO: 15.3 % — SIGNIFICANT CHANGE UP (ref 13–44)
MCHC RBC-ENTMCNC: 24.6 PG — LOW (ref 27–34)
MCHC RBC-ENTMCNC: 32.3 GM/DL — SIGNIFICANT CHANGE UP (ref 32–36)
MCV RBC AUTO: 76.2 FL — LOW (ref 80–100)
MONOCYTES # BLD AUTO: 0.6 K/UL — SIGNIFICANT CHANGE UP (ref 0–0.9)
MONOCYTES NFR BLD AUTO: 6.2 % — SIGNIFICANT CHANGE UP (ref 2–14)
NEUTROPHILS # BLD AUTO: 7.46 K/UL — HIGH (ref 1.8–7.4)
NEUTROPHILS NFR BLD AUTO: 76.5 % — SIGNIFICANT CHANGE UP (ref 43–77)
PLATELET # BLD AUTO: 275 K/UL — SIGNIFICANT CHANGE UP (ref 150–400)
POTASSIUM SERPL-MCNC: 4 MMOL/L — SIGNIFICANT CHANGE UP (ref 3.5–5.3)
POTASSIUM SERPL-SCNC: 4 MMOL/L — SIGNIFICANT CHANGE UP (ref 3.5–5.3)
PROT ?TM UR-MCNC: 11 MG/DL — SIGNIFICANT CHANGE UP (ref 0–12)
PROT SERPL-MCNC: 6.7 G/DL — SIGNIFICANT CHANGE UP (ref 6.6–8.7)
PROT/CREAT UR-RTO: 0.1 RATIO — SIGNIFICANT CHANGE UP
RBC # BLD: 4.71 M/UL — SIGNIFICANT CHANGE UP (ref 3.8–5.2)
RBC # FLD: 13.9 % — SIGNIFICANT CHANGE UP (ref 10.3–14.5)
SARS-COV-2 RNA SPEC QL NAA+PROBE: SIGNIFICANT CHANGE UP
SODIUM SERPL-SCNC: 132 MMOL/L — LOW (ref 135–145)
WBC # BLD: 9.75 K/UL — SIGNIFICANT CHANGE UP (ref 3.8–10.5)
WBC # FLD AUTO: 9.75 K/UL — SIGNIFICANT CHANGE UP (ref 3.8–10.5)

## 2022-07-23 PROCEDURE — 59400 OBSTETRICAL CARE: CPT | Mod: U9

## 2022-07-23 RX ORDER — HYDROCORTISONE 1 %
1 OINTMENT (GRAM) TOPICAL EVERY 6 HOURS
Refills: 0 | Status: DISCONTINUED | OUTPATIENT
Start: 2022-07-23 | End: 2022-07-25

## 2022-07-23 RX ORDER — ACETAMINOPHEN 500 MG
3 TABLET ORAL
Qty: 0 | Refills: 0 | DISCHARGE
Start: 2022-07-23

## 2022-07-23 RX ORDER — OXYTOCIN 10 UNIT/ML
333.33 VIAL (ML) INJECTION
Qty: 20 | Refills: 0 | Status: DISCONTINUED | OUTPATIENT
Start: 2022-07-23 | End: 2022-07-23

## 2022-07-23 RX ORDER — SODIUM CHLORIDE 9 MG/ML
3 INJECTION INTRAMUSCULAR; INTRAVENOUS; SUBCUTANEOUS EVERY 8 HOURS
Refills: 0 | Status: DISCONTINUED | OUTPATIENT
Start: 2022-07-23 | End: 2022-07-25

## 2022-07-23 RX ORDER — IBUPROFEN 200 MG
600 TABLET ORAL EVERY 6 HOURS
Refills: 0 | Status: DISCONTINUED | OUTPATIENT
Start: 2022-07-23 | End: 2022-07-25

## 2022-07-23 RX ORDER — SODIUM CHLORIDE 9 MG/ML
1000 INJECTION, SOLUTION INTRAVENOUS
Refills: 0 | Status: DISCONTINUED | OUTPATIENT
Start: 2022-07-23 | End: 2022-07-23

## 2022-07-23 RX ORDER — CITRIC ACID/SODIUM CITRATE 300-500 MG
30 SOLUTION, ORAL ORAL ONCE
Refills: 0 | Status: DISCONTINUED | OUTPATIENT
Start: 2022-07-23 | End: 2022-07-23

## 2022-07-23 RX ORDER — OXYCODONE HYDROCHLORIDE 5 MG/1
5 TABLET ORAL ONCE
Refills: 0 | Status: DISCONTINUED | OUTPATIENT
Start: 2022-07-23 | End: 2022-07-25

## 2022-07-23 RX ORDER — DIPHENHYDRAMINE HCL 50 MG
25 CAPSULE ORAL EVERY 6 HOURS
Refills: 0 | Status: DISCONTINUED | OUTPATIENT
Start: 2022-07-23 | End: 2022-07-25

## 2022-07-23 RX ORDER — CHLORHEXIDINE GLUCONATE 213 G/1000ML
1 SOLUTION TOPICAL ONCE
Refills: 0 | Status: DISCONTINUED | OUTPATIENT
Start: 2022-07-23 | End: 2022-07-23

## 2022-07-23 RX ORDER — BENZOCAINE 10 %
1 GEL (GRAM) MUCOUS MEMBRANE EVERY 6 HOURS
Refills: 0 | Status: DISCONTINUED | OUTPATIENT
Start: 2022-07-23 | End: 2022-07-25

## 2022-07-23 RX ORDER — OXYTOCIN 10 UNIT/ML
333.33 VIAL (ML) INJECTION
Qty: 20 | Refills: 0 | Status: DISCONTINUED | OUTPATIENT
Start: 2022-07-23 | End: 2022-07-25

## 2022-07-23 RX ORDER — AER TRAVELER 0.5 G/1
1 SOLUTION RECTAL; TOPICAL EVERY 4 HOURS
Refills: 0 | Status: DISCONTINUED | OUTPATIENT
Start: 2022-07-23 | End: 2022-07-25

## 2022-07-23 RX ORDER — SIMETHICONE 80 MG/1
80 TABLET, CHEWABLE ORAL EVERY 4 HOURS
Refills: 0 | Status: DISCONTINUED | OUTPATIENT
Start: 2022-07-23 | End: 2022-07-25

## 2022-07-23 RX ORDER — DIBUCAINE 1 %
1 OINTMENT (GRAM) RECTAL EVERY 6 HOURS
Refills: 0 | Status: DISCONTINUED | OUTPATIENT
Start: 2022-07-23 | End: 2022-07-25

## 2022-07-23 RX ORDER — LANOLIN
1 OINTMENT (GRAM) TOPICAL EVERY 6 HOURS
Refills: 0 | Status: DISCONTINUED | OUTPATIENT
Start: 2022-07-23 | End: 2022-07-25

## 2022-07-23 RX ORDER — IBUPROFEN 200 MG
600 TABLET ORAL EVERY 6 HOURS
Refills: 0 | Status: COMPLETED | OUTPATIENT
Start: 2022-07-23 | End: 2023-06-21

## 2022-07-23 RX ORDER — MAGNESIUM HYDROXIDE 400 MG/1
30 TABLET, CHEWABLE ORAL
Refills: 0 | Status: DISCONTINUED | OUTPATIENT
Start: 2022-07-23 | End: 2022-07-25

## 2022-07-23 RX ORDER — TETANUS TOXOID, REDUCED DIPHTHERIA TOXOID AND ACELLULAR PERTUSSIS VACCINE, ADSORBED 5; 2.5; 8; 8; 2.5 [IU]/.5ML; [IU]/.5ML; UG/.5ML; UG/.5ML; UG/.5ML
0.5 SUSPENSION INTRAMUSCULAR ONCE
Refills: 0 | Status: DISCONTINUED | OUTPATIENT
Start: 2022-07-23 | End: 2022-07-25

## 2022-07-23 RX ORDER — OXYCODONE HYDROCHLORIDE 5 MG/1
5 TABLET ORAL
Refills: 0 | Status: DISCONTINUED | OUTPATIENT
Start: 2022-07-23 | End: 2022-07-25

## 2022-07-23 RX ORDER — IBUPROFEN 200 MG
1 TABLET ORAL
Qty: 0 | Refills: 0 | DISCHARGE
Start: 2022-07-23

## 2022-07-23 RX ORDER — PRAMOXINE HYDROCHLORIDE 150 MG/15G
1 AEROSOL, FOAM RECTAL EVERY 4 HOURS
Refills: 0 | Status: DISCONTINUED | OUTPATIENT
Start: 2022-07-23 | End: 2022-07-25

## 2022-07-23 RX ORDER — ACETAMINOPHEN 500 MG
975 TABLET ORAL
Refills: 0 | Status: DISCONTINUED | OUTPATIENT
Start: 2022-07-23 | End: 2022-07-25

## 2022-07-23 RX ORDER — KETOROLAC TROMETHAMINE 30 MG/ML
30 SYRINGE (ML) INJECTION ONCE
Refills: 0 | Status: DISCONTINUED | OUTPATIENT
Start: 2022-07-23 | End: 2022-07-25

## 2022-07-23 RX ORDER — OXYTOCIN 10 UNIT/ML
2 VIAL (ML) INJECTION
Qty: 30 | Refills: 0 | Status: DISCONTINUED | OUTPATIENT
Start: 2022-07-23 | End: 2022-07-25

## 2022-07-23 RX ADMIN — Medication 2 MILLIUNIT(S)/MIN: at 12:08

## 2022-07-23 RX ADMIN — Medication 1000 MILLIUNIT(S)/MIN: at 16:42

## 2022-07-23 NOTE — DISCHARGE NOTE OB - CARE PROVIDER_API CALL
Braden Tsang)  Obstetrics and Gynecology  [address] [place] [id number]  Phone: [phone]  Fax: [phone]  Follow Up Time:

## 2022-07-23 NOTE — DISCHARGE NOTE OB - MEDICATION SUMMARY - MEDICATIONS TO TAKE
I will START or STAY ON the medications listed below when I get home from the hospital:    acetaminophen 325 mg oral tablet  -- 3 tab(s) by mouth every 6 hours x 5 days  -- Indication: For     ibuprofen 600 mg oral tablet  -- 1 tab(s) by mouth every 6 hours  -- Indication: For    I will START or STAY ON the medications listed below when I get home from the hospital:    acetaminophen 325 mg oral tablet  -- 3 tab(s) by mouth every 6 hours x 5 days  -- Indication: For     ibuprofen 600 mg oral tablet  -- 1 tab(s) by mouth every 6 hours  -- Indication: For     labetalol 200 mg oral tablet  -- 1 tab(s) by mouth every 12 hours   -- It is very important that you take or use this exactly as directed.  Do not skip doses or discontinue unless directed by your doctor.  May cause drowsiness or dizziness.  Some non-prescription drugs may aggravate your condition.  Read all labels carefully.  If a warning appears, check with your doctor before taking.    -- Indication: For Elevated BPs

## 2022-07-23 NOTE — OB PROVIDER DELIVERY SUMMARY - NS_GESTAGEATBIRTHA_OBGYN_ALL_OB_FT
[phone]     mom called, he has swollen glands, low grade fever, sore throat      would like a call
Mother stated that she would like child to be seen so her call was transferred to the  to schedule appointment 
40w1d

## 2022-07-23 NOTE — DISCHARGE NOTE OB - PLAN OF CARE
Please call your provider to schedule postpartum visit in 2-3 weeks. Take medications as directed, regular diet, activity as tolerated. Exclusive breast feeding for the first 6 months is recommended. Nothing per vagina for 6 weeks (incl. sex, douching, etc). If you have additional concerns, please inform your provider. Please call your provider to schedule postpartum visit in 2-3 weeks. Take medications as directed, regular diet, activity as tolerated. Exclusive breast feeding for the first 6 months is recommended. Nothing per vagina for 6 weeks (incl. sex, douching, etc). If you have additional concerns, please inform your provider.    Call your doctor or return to the hospital if you have headaches that do not resolve with tylenol, vision changes, upper abdominal pain, or elevated blood pressures greater than 150/100.

## 2022-07-23 NOTE — OB PROVIDER H&P - NSHPPHYSICALEXAM_GEN_ALL_CORE
HR: 101 (07-23-22 @ 09:57) (101 - 101)  BP: 132/90 (07-23-22 @ 09:57) (132/90 - 132/90)    Gen: NAD, well-appearing, AAOx3   Abd: Soft, gravid  Ext: non-tender, non-edematous    Bedside sono: VTX, anterior placenta, SHAMIKA 4  SSE: no gross pooling noted, nitrazine indeterminate, amnisure pending  SVE: 4/50/-2    FHT: baseline 140s, moderate variability, +accels, -decels   [place]: irregular CTX

## 2022-07-23 NOTE — OB RN DELIVERY SUMMARY - NSSELHIDDEN_OBGYN_ALL_OB_FT
[NS_DeliveryAttending1_OBGYN_ALL_OB_FT:[id number]],[NS_DeliveryRN_OBGYN_ALL_OB_FT:[id number]] [NS_DeliveryAttending1_OBGYN_ALL_OB_FT:[id number]],[NS_DeliveryRN_OBGYN_ALL_OB_FT:[id number]],[NS_DeliveryAssist2_OBGYN_ALL_OB_FT:[id number]=]

## 2022-07-23 NOTE — DISCHARGE NOTE OB - CARE PLAN
Principal Discharge DX:	 (normal spontaneous vaginal delivery)  Assessment and plan of treatment:	Please call your provider to schedule postpartum visit in 2-3 weeks. Take medications as directed, regular diet, activity as tolerated. Exclusive breast feeding for the first 6 months is recommended. Nothing per vagina for 6 weeks (incl. sex, douching, etc). If you have additional concerns, please inform your provider.   1 Principal Discharge DX:	 (normal spontaneous vaginal delivery)  Assessment and plan of treatment:	Please call your provider to schedule postpartum visit in 2-3 weeks. Take medications as directed, regular diet, activity as tolerated. Exclusive breast feeding for the first 6 months is recommended. Nothing per vagina for 6 weeks (incl. sex, douching, etc). If you have additional concerns, please inform your provider.    Call your doctor or return to the hospital if you have headaches that do not resolve with tylenol, vision changes, upper abdominal pain, or elevated blood pressures greater than 150/100.

## 2022-07-23 NOTE — OB PROVIDER H&P - ASSESSMENT
A/P: 32y  at 40w1d GA who is admitted to L&D in labor.  -Admit to L&D  -Consent  -Admission labs  -FU amnisure   -IV fluids  -Fetus: Cat I tracing. Continuous toco and fetal monitoring.   -GBS: Negative, no GBS ppx required   -Analgesia: desires epidural

## 2022-07-23 NOTE — OB RN DELIVERY SUMMARY - NS_SEPSISRSKCALC_OBGYN_ALL_OB_FT
EOS calculated successfully. EOS Risk Factor: 0.5/1000 live births ([facility] national incidence); GA=40w1d; Temp=98.7; ROM=6.533; GBS='Negative'; Antibiotics='No antibiotics or any antibiotics < 2 hrs prior to birth'

## 2022-07-23 NOTE — DISCHARGE NOTE OB - PATIENT PORTAL LINK FT
You can access the FollowMyHealth Patient Portal offered by [facility] by registering at the following website: [email or web address]. By joining [employer]’s FollowMyHealth portal, you will also be able to view your health information using other applications (apps) compatible with our system.

## 2022-07-23 NOTE — OB PROVIDER DELIVERY SUMMARY - NSPROVIDERDELIVERYNOTE_OBGYN_ALL_OB_FT
32y  presenting in labor at 40w1d.     at 1402 of a live male infant with Apgars 9/9. Delivered ELEAZAR, loose nuchal cord x1, clear fluid. Infant's head delivered with maternal expulsive efforts. Shoulders delivered without difficulty followed by the rest of the body. Nose and mouth were bulb suctioned. Cord clamped and cut after delay. Samples obtained. Baby handed to patient. Placenta delivered spontaneously, intact at 1406. Pitocin started. Excellent hemostasis achieved. Cervix, perineum and vagina were inspected and a first degree laceration was noted and repaired under local anesthesia. EBL 122cc. Pt tolerated procedure well, in stable condition, recovering in LDR. Infant in LDR. Instrument/sponge count correct x 2 and confirmed by nurse.

## 2022-07-23 NOTE — OB RN PATIENT PROFILE - FALL HARM RISK - UNIVERSAL INTERVENTIONS
Bed in lowest position, wheels locked, appropriate side rails in place/Call bell, personal items and telephone in reach/Instruct patient to call for assistance before getting out of bed or chair/Non-slip footwear when patient is out of bed/[place] to call system/Physically safe environment - no spills, clutter or unnecessary equipment/Purposeful Proactive Rounding/Room/bathroom lighting operational, light cord in reach

## 2022-07-23 NOTE — OB RN TRIAGE NOTE - FALL HARM RISK - UNIVERSAL INTERVENTIONS
Bed in lowest position, wheels locked, appropriate side rails in place/Call bell, personal items and telephone in reach/Instruct patient to call for assistance before getting out of bed or chair/Non-slip footwear when patient is out of bed/[place] to call system/Physically safe environment - no spills, clutter or unnecessary equipment/Purposeful Proactive Rounding/Room/bathroom lighting operational, light cord in reach

## 2022-07-23 NOTE — OB PROVIDER LABOR PROGRESS NOTE - ASSESSMENT
Pt admitted in labor    -VSS  -FHR tracing Cat. 1  -Continue pit  -Forebag ruptured clear fluid @ 1323    Discussed with Dr. Tsang

## 2022-07-23 NOTE — OB PROVIDER H&P - HISTORY OF PRESENT ILLNESS
32y  at 40w1d GA who presents to L&D for r/o ROM. Patient c/o occasional CTX. Patient denies vaginal bleeding. She endorses good fetal movement. Denies fevers, chills, nausea, vomiting, chest pain, SOB, dizziness and headache. No other complaints at this time.     JUAN LUIS: 2022    Prenatal course uncomplicated.      POB:  G1:  (2022)  G2: current  PGYN: -fibroids, -ovarian cysts, denies STD hx, denies abnormal PAPs   PMH: Denies  PSH: Denies  SH: Denies EtOH, tobacco and illicit drug use during this pregnancy  Meds: PNVs  Allergies: NKDA

## 2022-07-23 NOTE — OB PROVIDER DELIVERY SUMMARY - NSSELHIDDEN_OBGYN_ALL_OB_FT
[NS_DeliveryAttending1_OBGYN_ALL_OB_FT:[id number]],[NS_DeliveryRN_OBGYN_ALL_OB_FT:[id number]],[NS_DeliveryAssist1_OBGYN_ALL_OB_FT:[id number]=]

## 2022-07-23 NOTE — OB RN DELIVERY SUMMARY - BABY A SEX
CERTIFICATE OF WORK    April 3, 2018      Re: Judah Mcclain   [place] [address]      This is to certify that Judah Mcclain has been under my care from 4/3/2018 and can do sedentary work for 1 week until reevaluation from Orthopedics                SIGNATURE:___________________________________________,   4/3/2018  Dr. Nugent        Ur[facility]  [address]  [address] [phone]           Male

## 2022-07-23 NOTE — DISCHARGE NOTE OB - HOSPITAL COURSE
Patient is a 33yo  delivered via spontaneous vaginal delivery. She was transferred to postpartum unit without complications during her stay. Upon discharge she is voiding, tolerating PO, ambulating, and pain is well controlled.   Patient is a 33yo  delivered via spontaneous vaginal delivery. She was transferred to postpartum unit without complications during her stay. Upon discharge she is voiding, tolerating PO, ambulating, and pain is well controlled.  Patient discharged home on labetalol 200mg BID. BPs WNL at the time of discharge.                          10.2   7.96  )-----------( 240      ( 2022 06:00 )             32.1   ICU Vital Signs Last 24 Hrs  T(C): 37.4 (2022 12:00), Max: 37.4 (2022 12:00)  T(F): 99.3 (2022 12:00), Max: 99.3 (2022 12:00)  HR: 75 (2022 12:00) (75 - 96)  BP: 117/66 (2022 12:00) (117/66 - 144/93)  BP(mean): --  ABP: --  ABP(mean): --  RR: 18 (2022 12:00) (18 - 18)  SpO2: 98% (2022 12:00) (97% - 100%)    O2 Parameters below as of 2022 12:00  Patient On (Oxygen Delivery Method): room air

## 2022-07-23 NOTE — DISCHARGE NOTE OB - NS MD DC FALL RISK RISK
For information on Fall & Injury Prevention, visit: [email or web address] OR  [email or web address] OR  https://www.cdc.gov/steadi/patient.html

## 2022-07-24 LAB
ALBUMIN SERPL ELPH-MCNC: 3.2 G/DL — LOW (ref 3.3–5.2)
ALP SERPL-CCNC: 172 U/L — HIGH (ref 40–120)
ALT FLD-CCNC: 18 U/L — SIGNIFICANT CHANGE UP
ANION GAP SERPL CALC-SCNC: 11 MMOL/L — SIGNIFICANT CHANGE UP (ref 5–17)
APPEARANCE UR: ABNORMAL
APTT BLD: 26.7 SEC — LOW (ref 27.5–35.5)
AST SERPL-CCNC: 32 U/L — HIGH
BACTERIA # UR AUTO: ABNORMAL
BASOPHILS # BLD AUTO: 0.03 K/UL — SIGNIFICANT CHANGE UP (ref 0–0.2)
BASOPHILS NFR BLD AUTO: 0.3 % — SIGNIFICANT CHANGE UP (ref 0–2)
BILIRUB SERPL-MCNC: <0.2 MG/DL — LOW (ref 0.4–2)
BILIRUB UR-MCNC: NEGATIVE — SIGNIFICANT CHANGE UP
BUN SERPL-MCNC: 9.5 MG/DL — SIGNIFICANT CHANGE UP (ref 8–20)
CALCIUM SERPL-MCNC: 8.6 MG/DL — SIGNIFICANT CHANGE UP (ref 8.6–10.2)
CHLORIDE SERPL-SCNC: 106 MMOL/L — SIGNIFICANT CHANGE UP (ref 98–107)
CO2 SERPL-SCNC: 21 MMOL/L — LOW (ref 22–29)
COLOR SPEC: YELLOW — SIGNIFICANT CHANGE UP
COVID-19 SPIKE DOMAIN AB INTERP: POSITIVE
COVID-19 SPIKE DOMAIN ANTIBODY RESULT: >250 U/ML — HIGH
CREAT ?TM UR-MCNC: 48 MG/DL — SIGNIFICANT CHANGE UP
CREAT SERPL-MCNC: 0.69 MG/DL — SIGNIFICANT CHANGE UP (ref 0.5–1.3)
DIFF PNL FLD: ABNORMAL
EGFR: 118 ML/MIN/1.73M2 — SIGNIFICANT CHANGE UP
EOSINOPHIL # BLD AUTO: 0.09 K/UL — SIGNIFICANT CHANGE UP (ref 0–0.5)
EOSINOPHIL NFR BLD AUTO: 0.9 % — SIGNIFICANT CHANGE UP (ref 0–6)
EPI CELLS # UR: SIGNIFICANT CHANGE UP
FIBRINOGEN PPP-MCNC: 686 MG/DL — HIGH (ref 330–520)
GLUCOSE SERPL-MCNC: 92 MG/DL — SIGNIFICANT CHANGE UP (ref 70–99)
GLUCOSE UR QL: NEGATIVE MG/DL — SIGNIFICANT CHANGE UP
HCT VFR BLD CALC: 30.2 % — LOW (ref 34.5–45)
HCT VFR BLD CALC: 32.4 % — LOW (ref 34.5–45)
HGB BLD-MCNC: 10.3 G/DL — LOW (ref 11.5–15.5)
HGB BLD-MCNC: 9.6 G/DL — LOW (ref 11.5–15.5)
IMM GRANULOCYTES NFR BLD AUTO: 0.7 % — SIGNIFICANT CHANGE UP (ref 0–1.5)
INR BLD: 0.91 RATIO — SIGNIFICANT CHANGE UP (ref 0.88–1.16)
KETONES UR-MCNC: NEGATIVE — SIGNIFICANT CHANGE UP
LDH SERPL L TO P-CCNC: 276 U/L — HIGH (ref 98–192)
LEUKOCYTE ESTERASE UR-ACNC: ABNORMAL
LYMPHOCYTES # BLD AUTO: 1.05 K/UL — SIGNIFICANT CHANGE UP (ref 1–3.3)
LYMPHOCYTES # BLD AUTO: 10.4 % — LOW (ref 13–44)
MCHC RBC-ENTMCNC: 24.6 PG — LOW (ref 27–34)
MCHC RBC-ENTMCNC: 31.8 GM/DL — LOW (ref 32–36)
MCV RBC AUTO: 77.5 FL — LOW (ref 80–100)
MONOCYTES # BLD AUTO: 0.45 K/UL — SIGNIFICANT CHANGE UP (ref 0–0.9)
MONOCYTES NFR BLD AUTO: 4.5 % — SIGNIFICANT CHANGE UP (ref 2–14)
NEUTROPHILS # BLD AUTO: 8.4 K/UL — HIGH (ref 1.8–7.4)
NEUTROPHILS NFR BLD AUTO: 83.2 % — HIGH (ref 43–77)
NITRITE UR-MCNC: NEGATIVE — SIGNIFICANT CHANGE UP
PH UR: 7 — SIGNIFICANT CHANGE UP (ref 5–8)
PLATELET # BLD AUTO: 231 K/UL — SIGNIFICANT CHANGE UP (ref 150–400)
POTASSIUM SERPL-MCNC: 3.8 MMOL/L — SIGNIFICANT CHANGE UP (ref 3.5–5.3)
POTASSIUM SERPL-SCNC: 3.8 MMOL/L — SIGNIFICANT CHANGE UP (ref 3.5–5.3)
PROT ?TM UR-MCNC: 17 MG/DL — HIGH (ref 0–12)
PROT SERPL-MCNC: 6.2 G/DL — LOW (ref 6.6–8.7)
PROT UR-MCNC: 15
PROT/CREAT UR-RTO: 0.4 RATIO — HIGH
PROTHROM AB SERPL-ACNC: 10.5 SEC — SIGNIFICANT CHANGE UP (ref 10.5–13.4)
RBC # BLD: 4.18 M/UL — SIGNIFICANT CHANGE UP (ref 3.8–5.2)
RBC # FLD: 13.9 % — SIGNIFICANT CHANGE UP (ref 10.3–14.5)
RBC CASTS # UR COMP ASSIST: >50 /HPF (ref 0–4)
SARS-COV-2 IGG+IGM SERPL QL IA: >250 U/ML — HIGH
SARS-COV-2 IGG+IGM SERPL QL IA: POSITIVE
SODIUM SERPL-SCNC: 138 MMOL/L — SIGNIFICANT CHANGE UP (ref 135–145)
SP GR SPEC: 1.01 — SIGNIFICANT CHANGE UP (ref 1.01–1.02)
T PALLIDUM AB TITR SER: NEGATIVE — SIGNIFICANT CHANGE UP
URATE SERPL-MCNC: 4.6 MG/DL — SIGNIFICANT CHANGE UP (ref 2.4–5.7)
UROBILINOGEN FLD QL: NEGATIVE MG/DL — SIGNIFICANT CHANGE UP
WBC # BLD: 10.09 K/UL — SIGNIFICANT CHANGE UP (ref 3.8–10.5)
WBC # FLD AUTO: 10.09 K/UL — SIGNIFICANT CHANGE UP (ref 3.8–10.5)
WBC UR QL: ABNORMAL /HPF (ref 0–5)

## 2022-07-24 RX ORDER — MAGNESIUM SULFATE 500 MG/ML
4 VIAL (ML) INJECTION ONCE
Refills: 0 | Status: DISCONTINUED | OUTPATIENT
Start: 2022-07-24 | End: 2022-07-24

## 2022-07-24 RX ORDER — MAGNESIUM SULFATE 500 MG/ML
2 VIAL (ML) INJECTION
Qty: 40 | Refills: 0 | Status: DISCONTINUED | OUTPATIENT
Start: 2022-07-24 | End: 2022-07-24

## 2022-07-24 RX ORDER — LABETALOL HCL 100 MG
200 TABLET ORAL EVERY 12 HOURS
Refills: 0 | Status: DISCONTINUED | OUTPATIENT
Start: 2022-07-24 | End: 2022-07-25

## 2022-07-24 RX ADMIN — Medication 200 MILLIGRAM(S): at 20:33

## 2022-07-24 NOTE — CHART NOTE - NSCHARTNOTEFT_GEN_A_CORE
Patient is post partum day 1 s/p normal spontaneous vaginal delivery, delivery was uncomplicated. Patient denies headache, shortness of breath, right upper quadrant pain.     Vital Signs Last 24 Hrs  T(C): 36.9 (24 Jul 2022 15:16), Max: 37 (23 Jul 2022 23:17)  T(F): 98.5 (24 Jul 2022 15:16), Max: 98.6 (23 Jul 2022 23:17)  HR: 96 (24 Jul 2022 17:52) (77 - 98)  BP: 140/90 (24 Jul 2022 17:52) (130/88 - 144/93)  RR: 18 (24 Jul 2022 15:16) (18 - 18)  SpO2: 98% (24 Jul 2022 15:16) (97% - 98%)                          10.3   10.09 )-----------( 231      ( 24 Jul 2022 15:30 )             32.4     07-24    138  |  106  |  9.5  ----------------------------<  92  3.8   |  21.0<L>  |  0.69    Ca    8.6      24 Jul 2022 15:30    TPro  6.2<L>  /  Alb  3.2<L>  /  TBili  <0.2<L>  /  DBili  x   /  AST  32<H>  /  ALT  18  /  AlkPhos  172<H>  07-24    P/C ratio 0.4 (collected after delivery and likely not accurate due to sample having lochia)     Plan  - patient had elevated BPs, however she has not had elevated pressures in the severe range. PIH labs are normal apart from P/C ratio that was 0.4. P/C ratio was collected after delivery and sample is not accurate with lochia. Patient does not meet criteria for severe pre-eclampsia and does not require MgSO4 therapy.   - will start patient on Labetalol 200 mg BID    d/w Dr. Tsang and Dr. Sanchez ([facility])

## 2022-07-25 VITALS — TEMPERATURE: 99 F | HEART RATE: 74 BPM

## 2022-07-25 LAB
ALBUMIN SERPL ELPH-MCNC: 3 G/DL — LOW (ref 3.3–5.2)
ALP SERPL-CCNC: 159 U/L — HIGH (ref 40–120)
ALT FLD-CCNC: 18 U/L — SIGNIFICANT CHANGE UP
ANION GAP SERPL CALC-SCNC: 12 MMOL/L — SIGNIFICANT CHANGE UP (ref 5–17)
APTT BLD: 26.5 SEC — LOW (ref 27.5–35.5)
AST SERPL-CCNC: 29 U/L — SIGNIFICANT CHANGE UP
BASOPHILS # BLD AUTO: 0.03 K/UL — SIGNIFICANT CHANGE UP (ref 0–0.2)
BASOPHILS NFR BLD AUTO: 0.4 % — SIGNIFICANT CHANGE UP (ref 0–2)
BILIRUB SERPL-MCNC: <0.2 MG/DL — LOW (ref 0.4–2)
BUN SERPL-MCNC: 10 MG/DL — SIGNIFICANT CHANGE UP (ref 8–20)
CALCIUM SERPL-MCNC: 8.1 MG/DL — LOW (ref 8.6–10.2)
CHLORIDE SERPL-SCNC: 101 MMOL/L — SIGNIFICANT CHANGE UP (ref 98–107)
CO2 SERPL-SCNC: 21 MMOL/L — LOW (ref 22–29)
CREAT SERPL-MCNC: 0.55 MG/DL — SIGNIFICANT CHANGE UP (ref 0.5–1.3)
EGFR: 125 ML/MIN/1.73M2 — SIGNIFICANT CHANGE UP
EOSINOPHIL # BLD AUTO: 0.05 K/UL — SIGNIFICANT CHANGE UP (ref 0–0.5)
EOSINOPHIL NFR BLD AUTO: 0.6 % — SIGNIFICANT CHANGE UP (ref 0–6)
FIBRINOGEN PPP-MCNC: 874 MG/DL — SIGNIFICANT CHANGE UP (ref 330–520)
GLUCOSE SERPL-MCNC: 80 MG/DL — SIGNIFICANT CHANGE UP (ref 70–99)
HCT VFR BLD CALC: 32.1 % — LOW (ref 34.5–45)
HGB BLD-MCNC: 10.2 G/DL — LOW (ref 11.5–15.5)
IMM GRANULOCYTES NFR BLD AUTO: 1 % — SIGNIFICANT CHANGE UP (ref 0–1.5)
INR BLD: 0.91 RATIO — SIGNIFICANT CHANGE UP (ref 0.88–1.16)
LDH SERPL L TO P-CCNC: 270 U/L — HIGH (ref 98–192)
LYMPHOCYTES # BLD AUTO: 0.91 K/UL — LOW (ref 1–3.3)
LYMPHOCYTES # BLD AUTO: 11.4 % — LOW (ref 13–44)
MCHC RBC-ENTMCNC: 24.9 PG — LOW (ref 27–34)
MCHC RBC-ENTMCNC: 31.8 GM/DL — LOW (ref 32–36)
MCV RBC AUTO: 78.5 FL — LOW (ref 80–100)
MONOCYTES # BLD AUTO: 0.42 K/UL — SIGNIFICANT CHANGE UP (ref 0–0.9)
MONOCYTES NFR BLD AUTO: 5.3 % — SIGNIFICANT CHANGE UP (ref 2–14)
NEUTROPHILS # BLD AUTO: 6.47 K/UL — SIGNIFICANT CHANGE UP (ref 1.8–7.4)
NEUTROPHILS NFR BLD AUTO: 81.3 % — HIGH (ref 43–77)
PLATELET # BLD AUTO: 240 K/UL — SIGNIFICANT CHANGE UP (ref 150–400)
POTASSIUM SERPL-MCNC: 3.7 MMOL/L — SIGNIFICANT CHANGE UP (ref 3.5–5.3)
POTASSIUM SERPL-SCNC: 3.7 MMOL/L — SIGNIFICANT CHANGE UP (ref 3.5–5.3)
PROT SERPL-MCNC: 6.3 G/DL — LOW (ref 6.6–8.7)
PROTHROM AB SERPL-ACNC: 10.5 SEC — SIGNIFICANT CHANGE UP (ref 10.5–13.4)
RBC # BLD: 4.09 M/UL — SIGNIFICANT CHANGE UP (ref 3.8–5.2)
RBC # FLD: 14.1 % — SIGNIFICANT CHANGE UP (ref 10.3–14.5)
SODIUM SERPL-SCNC: 134 MMOL/L — LOW (ref 135–145)
URATE SERPL-MCNC: 4.7 MG/DL — SIGNIFICANT CHANGE UP (ref 2.4–5.7)
WBC # BLD: 7.96 K/UL — SIGNIFICANT CHANGE UP (ref 3.8–10.5)
WBC # FLD AUTO: 7.96 K/UL — SIGNIFICANT CHANGE UP (ref 3.8–10.5)

## 2022-07-25 PROCEDURE — 86769 SARS-COV-2 COVID-19 ANTIBODY: CPT

## 2022-07-25 PROCEDURE — 36415 COLL VENOUS BLD VENIPUNCTURE: CPT

## 2022-07-25 PROCEDURE — 84550 ASSAY OF BLOOD/URIC ACID: CPT

## 2022-07-25 PROCEDURE — 86850 RBC ANTIBODY SCREEN: CPT

## 2022-07-25 PROCEDURE — 80053 COMPREHEN METABOLIC PANEL: CPT

## 2022-07-25 PROCEDURE — 85025 COMPLETE CBC W/AUTO DIFF WBC: CPT

## 2022-07-25 PROCEDURE — 85014 HEMATOCRIT: CPT

## 2022-07-25 PROCEDURE — 85018 HEMOGLOBIN: CPT

## 2022-07-25 PROCEDURE — 86780 TREPONEMA PALLIDUM: CPT

## 2022-07-25 PROCEDURE — 83615 LACTATE (LD) (LDH) ENZYME: CPT

## 2022-07-25 PROCEDURE — 85610 PROTHROMBIN TIME: CPT

## 2022-07-25 PROCEDURE — 86901 BLOOD TYPING SEROLOGIC RH(D): CPT

## 2022-07-25 PROCEDURE — 85730 THROMBOPLASTIN TIME PARTIAL: CPT

## 2022-07-25 PROCEDURE — 84156 ASSAY OF PROTEIN URINE: CPT

## 2022-07-25 PROCEDURE — U0005: CPT

## 2022-07-25 PROCEDURE — 82570 ASSAY OF URINE CREATININE: CPT

## 2022-07-25 PROCEDURE — 81001 URINALYSIS AUTO W/SCOPE: CPT

## 2022-07-25 PROCEDURE — U0003: CPT

## 2022-07-25 PROCEDURE — 85384 FIBRINOGEN ACTIVITY: CPT

## 2022-07-25 PROCEDURE — 86900 BLOOD TYPING SEROLOGIC ABO: CPT

## 2022-07-25 RX ORDER — LABETALOL HCL 100 MG
1 TABLET ORAL
Qty: 28 | Refills: 0
Start: 2022-07-25 | End: 2022-08-07

## 2022-07-25 RX ADMIN — Medication 200 MILLIGRAM(S): at 08:30

## 2022-07-25 NOTE — PROGRESS NOTE ADULT - ASSESSMENT
SHARMIN PERSAUD is a 32y  now PPD#2 s/p spontaneous vaginal delivery at 40w1d gestation. Postpartum complicated by elevated BPs.    A/P:    -Vital signs stable BPs ON 130s/120s- 80s-70s  -Hgb: 11.6 -> 9.2   -Pending AM PIH labs  -Voiding, tolerating PO, bowel function nml   -Advance care as tolerated   -Continue routine postpartum care and education  -Healthy male infant, circumcision done  -Dispo: Patient to be discharged when meeting all postpartum and postoperative milestones and pending attending approval. SHARMIN PERSAUD is a 32y  now PPD#2 s/p spontaneous vaginal delivery at 40w1d gestation. Postpartum complicated by elevated BPs.    A/P:    -Vital signs stable BPs ON 130s/120s- 80s-70s, started on labetalol 200 BID per Dr. Tsang   -Hgb: 11.6 -> 9.2   -Pending AM PIH labs  -Voiding, tolerating PO, bowel function nml   -Advance care as tolerated   -Continue routine postpartum care and education  -Healthy male infant, circumcision done  -Dispo: Patient to be discharged when meeting all postpartum and postoperative milestones and pending attending approval.

## 2022-07-25 NOTE — PROGRESS NOTE ADULT - SUBJECTIVE AND OBJECTIVE BOX
SHARMIN PERSAUD is a 32y  now PPD#2 s/p spontaneous vaginal delivery at 40w1d gestation. Postpartum complicated by PECwoSF.    S:    No acute events overnight.   The patient has no complaints.  Pain controlled with current treatment regimen.   She is ambulating without difficulty and tolerating PO.   + flatus/+BM/+ voiding   She endorses appropriate lochia, which is decreasing.   She is breastfeeding without difficulty.   She denies fevers, chills, nausea and vomiting.   She denies lightheadedness, dizziness, palpitations, chest pain and SOB.     O:    T(C): 36.7 (22 @ 00:30), Max: 37 (22 @ 20:14)  HR: 80 (22 @ 00:30) (80 - 98)  BP: 122/76 (22 @ 00:30) (122/76 - 144/93)  RR: 18 (22 @ 00:30) (18 - 18)  SpO2: 99% (22 @ 00:30) (97% - 100%)    Gen: NAD, AOx3  CV: RRR, S1/S2 present  Pulm: CTAB  Breast: Nontender, non-engorged   Abdomen:  Soft, non-tender, non-distended, +bowel sounds  Uterus:  Fundus firm below umbilicus  VE:  Expectant lochia  Ext:  Non-tender and non-edematous                          10.3   10.09 )-----------( 231      ( 2022 15:30 )             32.4         138  |  106  |  9.5  ----------------------------<  92  3.8   |  21.0<L>  |  0.69    Ca    8.6      2022 15:30    TPro  6.2<L>  /  Alb  3.2<L>  /  TBili  <0.2<L>  /  DBili  x   /  AST  32<H>  /  ALT  18  /  AlkPhos  172<H>      SHARMIN PERSAUD is a 32y  now PPD#2 s/p spontaneous vaginal delivery at 40w1d gestation. Postpartum complicated by PECwoSF.    A/P:    -Vital signs stable BPs ON 130s/120s- 80s-70s  -Hgb: 11.6 -> 9.2   -Pending AM PIH labs  -Voiding, tolerating PO, bowel function nml   -Advance care as tolerated   -Continue routine postpartum care and education  -Healthy male infant, circumcision done  -Dispo: Patient to be discharged when meeting all postpartum and postoperative milestones and pending attending approval.   SHARMIN PERSAUD is a 32y  now PPD#2 s/p spontaneous vaginal delivery at 40w1d gestation. Postpartum complicated by elevated BPs.    S:    No acute events overnight.   The patient has no complaints.  Pain controlled with current treatment regimen.   She is ambulating without difficulty and tolerating PO.   + flatus/+BM/+ voiding   She endorses appropriate lochia, which is decreasing.   She is breastfeeding without difficulty.   She denies fevers, chills, nausea and vomiting.   She denies lightheadedness, dizziness, palpitations, chest pain and SOB.     O:    T(C): 36.7 (22 @ 00:30), Max: 37 (22 @ 20:14)  HR: 80 (22 @ 00:30) (80 - 98)  BP: 122/76 (22 @ 00:30) (122/76 - 144/93)  RR: 18 (22 @ 00:30) (18 - 18)  SpO2: 99% (22 @ 00:30) (97% - 100%)    Gen: NAD, AOx3  CV: RRR, S1/S2 present  Pulm: CTAB  Breast: Nontender, non-engorged   Abdomen:  Soft, non-tender, non-distended, +bowel sounds  Uterus:  Fundus firm below umbilicus  VE:  Expectant lochia  Ext:  Non-tender and non-edematous                          10.3   10.09 )-----------( 231      ( 2022 15:30 )             32.4         138  |  106  |  9.5  ----------------------------<  92  3.8   |  21.0<L>  |  0.69    Ca    8.6      2022 15:30    TPro  6.2<L>  /  Alb  3.2<L>  /  TBili  <0.2<L>  /  DBili  x   /  AST  32<H>  /  ALT  18  /  AlkPhos  172<H>

## 2022-07-25 NOTE — PROGRESS NOTE ADULT - ATTENDING COMMENTS
Agree with resident note.   Good control with current regimen.  routine PP care  Plan for d/c today- instructions reviewed with patient.

## 2022-07-29 ENCOUNTER — NON-APPOINTMENT (OUTPATIENT)
Age: 32
End: 2022-07-29

## 2022-07-29 ENCOUNTER — APPOINTMENT (OUTPATIENT)
Dept: OBGYN | Facility: CLINIC | Age: 32
End: 2022-07-29

## 2022-07-29 VITALS — DIASTOLIC BLOOD PRESSURE: 74 MMHG | SYSTOLIC BLOOD PRESSURE: 122 MMHG

## 2022-07-29 PROCEDURE — 99213 OFFICE O/P EST LOW 20 MIN: CPT

## 2022-07-29 NOTE — HISTORY OF PRESENT ILLNESS
[FreeTextEntry1] : 33 y/o female here for blood pressure check after having htn during delivery. Pt denies headaches, blurred vision and dizziness.  /74, no headache blurred vision epigastric discomfort trace edema patient to follow-up with cardiologist for blood pressure and given number for Dr. SEBASTIEN Valdes for family practice follow-up blood pressure is well next week return in 5 weeks postpartum [TextBox_4] : PT HERE FOR BP CHECK.

## 2022-08-04 ENCOUNTER — APPOINTMENT (OUTPATIENT)
Dept: CARDIOLOGY | Facility: CLINIC | Age: 32
End: 2022-08-04

## 2022-08-04 ENCOUNTER — NON-APPOINTMENT (OUTPATIENT)
Age: 32
End: 2022-08-04

## 2022-08-04 VITALS
DIASTOLIC BLOOD PRESSURE: 73 MMHG | RESPIRATION RATE: 16 BRPM | WEIGHT: 177 LBS | BODY MASS INDEX: 27.78 KG/M2 | SYSTOLIC BLOOD PRESSURE: 112 MMHG | HEIGHT: 67 IN | HEART RATE: 74 BPM | OXYGEN SATURATION: 96 % | TEMPERATURE: 97.9 F

## 2022-08-04 DIAGNOSIS — I49.3 VENTRICULAR PREMATURE DEPOLARIZATION: ICD-10-CM

## 2022-08-04 PROCEDURE — 93000 ELECTROCARDIOGRAM COMPLETE: CPT

## 2022-08-04 PROCEDURE — 99204 OFFICE O/P NEW MOD 45 MIN: CPT | Mod: 25

## 2022-08-04 PROCEDURE — 93306 TTE W/DOPPLER COMPLETE: CPT

## 2022-08-04 NOTE — DISCUSSION/SUMMARY
[FreeTextEntry1] : 32F presents to establish CV care\par \par 1. HTN\par -comfortable appearing, feeling well, euvolemic, bp stable today\par -pt states uneventful pregnancy, only found to have bp to 140s \par -denies features of preeclamspia\par -started on labetelol 200 bid\par -d/w pt: BP spectrum issues related to pregnancy can last up to 6 weeks post delivery\par -pt now 2.5 weeks post delivery, will cont current tx to complete 6 week course and then d/c\par -pt to cont to keep bp log at home, will call to discuss after stopping meds\par \par 2. hc of pvc's\par -none recently\par -ekg today showing SR\par -pt prev recommended to have an tte to r/o structural heart dz\par -will check tte\par \par \par  [EKG obtained to assist in diagnosis and management of assessed problem(s)] : EKG obtained to assist in diagnosis and management of assessed problem(s)

## 2022-08-04 NOTE — REVIEW OF SYSTEMS
[Feeling Fatigued] : feeling fatigued [Fever] : no fever [Headache] : no headache [Chills] : no chills [Blurry Vision] : no blurred vision [Sore Throat] : no sore throat [SOB] : no shortness of breath [Dyspnea on exertion] : not dyspnea during exertion [Chest Discomfort] : no chest discomfort [Lower Ext Edema] : no extremity edema [Palpitations] : no palpitations [Orthopnea] : no orthopnea [Syncope] : no syncope [Cough] : no cough [Wheezing] : no wheezing [Nausea] : no nausea [Vomiting] : no vomiting [Dizziness] : no dizziness [Easy Bleeding] : no tendency for easy bleeding [Easy Bruising] : no tendency for easy bruising

## 2022-08-04 NOTE — HISTORY OF PRESENT ILLNESS
[FreeTextEntry1] : 32F presents to establish care\par Sent in by: friend\par PMD:\par \par pt reports recent pregnancy, c/b HTN in the 140s. pt states her bp only became an issue during labor. otherwise her bp was stable and she had an uneventful pregnancy. vaginal delivery. \par pt had a baby boy, gilma. \par \par started on labetelol 200 bid, pt checking bp at home, getting in he 110-120s/80s. \par \par pt is breast feeding. \par \par this was pt second pregnancy. during her first pregnancy (baby girl), she was told that she had some PVCs during labor. pt denies palpitations or pvcs since.\par \par pt denies CP, SOB, at rest or on exertion. Denies palpitations, dizziness, diaphoresis, syncope, LE edema, orthopnea. \par pt denies blurry vision. \par \par \par Prev cardiac history: none\par \par Exercise: goes to gym several times a week (not currently 2/2 recent labor and delivery)\par Diet: none\par \par Previous cardiac testing: none\par Recent labs:\par \par \par EKG: SR 71\par \par \par Med hx: none\par OBGYN hx:  2 children.  +HTN in second pregnancy. Hx of miscarriage. regular menstrual cycles outside of pregnancy\par Sx hx: none\par Family hx: no known cardiac hx\par Social hx:  lives in [place], with your family. 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  no tob. no etoh. no drugs\par Meds: labatelol 200 bid\par Allergies: nkda\par

## 2023-01-04 ENCOUNTER — NON-APPOINTMENT (OUTPATIENT)
Age: 33
End: 2023-01-04

## 2023-08-23 ENCOUNTER — NON-APPOINTMENT (OUTPATIENT)
Age: 33
End: 2023-08-23

## 2023-08-23 ENCOUNTER — APPOINTMENT (OUTPATIENT)
Dept: OBGYN | Facility: CLINIC | Age: 33
End: 2023-08-23
Payer: COMMERCIAL

## 2023-08-23 VITALS
WEIGHT: 175 LBS | SYSTOLIC BLOOD PRESSURE: 123 MMHG | BODY MASS INDEX: 27.47 KG/M2 | DIASTOLIC BLOOD PRESSURE: 83 MMHG | HEIGHT: 67 IN

## 2023-08-23 DIAGNOSIS — Z34.90 ENCOUNTER FOR SUPERVISION OF NORMAL PREGNANCY, UNSPECIFIED, UNSPECIFIED TRIMESTER: ICD-10-CM

## 2023-08-23 DIAGNOSIS — Z01.419 ENCOUNTER FOR GYNECOLOGICAL EXAMINATION (GENERAL) (ROUTINE) W/OUT ABNORMAL FINDINGS: ICD-10-CM

## 2023-08-23 DIAGNOSIS — Z11.51 ENCOUNTER FOR SCREENING FOR HUMAN PAPILLOMAVIRUS (HPV): ICD-10-CM

## 2023-08-23 DIAGNOSIS — O13.3 GESTATIONAL [PREGNANCY-INDUCED] HYPERTENSION W/OUT SIGNIFICANT PROTEINURIA, THIRD TRIMESTER: ICD-10-CM

## 2023-08-23 DIAGNOSIS — Z12.39 ENCOUNTER FOR OTHER SCREENING FOR MALIGNANT NEOPLASM OF BREAST: ICD-10-CM

## 2023-08-23 DIAGNOSIS — O35.10X0 MATERNAL CARE FOR (SUSPECTED) CHROMOSOMAL ABNORMALITY IN FETUS, UNSPECIFIED, NOT APPLICABLE OR UNSPECIFIED: ICD-10-CM

## 2023-08-23 DIAGNOSIS — Z11.3 ENCOUNTER FOR SCREENING FOR INFECTIONS WITH A PREDOMINANTLY SEXUAL MODE OF TRANSMISSION: ICD-10-CM

## 2023-08-23 PROCEDURE — 99395 PREV VISIT EST AGE 18-39: CPT

## 2023-08-23 NOTE — PHYSICAL EXAM
[FreeTextEntry1] : SM [Appropriately responsive] : appropriately responsive [Alert] : alert [No Acute Distress] : no acute distress [No Lymphadenopathy] : no lymphadenopathy [Regular Rate Rhythm] : regular rate rhythm [No Murmurs] : no murmurs [Clear to Auscultation B/L] : clear to auscultation bilaterally [Soft] : soft [Non-tender] : non-tender [Non-distended] : non-distended [No HSM] : No HSM [No Lesions] : no lesions [No Mass] : no mass [Oriented x3] : oriented x3 [FreeTextEntry6] : Symmetrical no masses [Examination Of The Breasts] : a normal appearance [No Masses] : no breast masses were palpable [Labia Majora] : normal [Labia Minora] : normal [No Bleeding] : There was no active vaginal bleeding [Normal] : normal [Anteversion] : anteverted [Uterine Adnexae] : normal [Declined] : Patient declined rectal exam [FreeTextEntry8] : No masses nontender bilateral

## 2023-08-23 NOTE — HISTORY OF PRESENT ILLNESS
[TextBox_4] : ANNUAL LMP:8/20/2023 [PapSmeardate] : 11/29/21 [TextBox_31] : WNL [LMPDate] : 8/20/23 [MensesFreq] : 28 [MensesLength] : 5 [PGHxTotal] : 2 [[facility] : 2 [[facility] : 2 [TextBox_6] : 8/20/23 [TextBox_9] : 12 [TextBox_13] : 28 [TextBox_15] : 5 [FreeTextEntry1] : 8/20/23

## 2023-08-23 NOTE — PLAN
[FreeTextEntry1] : Pap smear completed patient will have baseline mammogram as mother has had breast cancer, breast self-exam taught, continue prenatal vitamin as she feels like she would like to conceive in the near future.  Patient has been worked up by cardiology and has been cleared with normal echocardiogram.  She will return in 12 months or sooner if pregnancy occurs.

## 2023-08-25 LAB
C TRACH RRNA SPEC QL NAA+PROBE: NOT DETECTED
HPV HIGH+LOW RISK DNA PNL CVX: NOT DETECTED
N GONORRHOEA RRNA SPEC QL NAA+PROBE: NOT DETECTED
SOURCE TP AMPLIFICATION: NORMAL

## 2023-08-28 LAB — CYTOLOGY CVX/VAG DOC THIN PREP: NORMAL

## 2023-09-27 NOTE — OB RN PATIENT PROFILE - FUNCTIONAL ASSESSMENT - BASIC MOBILITY 4.
Medical Necessity Clause: This procedure was medically necessary because the lesion that was treated was: 4 = No assist / stand by assistance

## 2024-07-30 DIAGNOSIS — Z80.3 FAMILY HISTORY OF MALIGNANT NEOPLASM OF BREAST: ICD-10-CM

## 2025-01-14 ENCOUNTER — NON-APPOINTMENT (OUTPATIENT)
Age: 35
End: 2025-01-14

## 2025-01-15 ENCOUNTER — APPOINTMENT (OUTPATIENT)
Dept: OBGYN | Facility: CLINIC | Age: 35
End: 2025-01-15
Payer: COMMERCIAL

## 2025-01-15 VITALS
HEART RATE: 85 BPM | BODY MASS INDEX: 26.68 KG/M2 | DIASTOLIC BLOOD PRESSURE: 85 MMHG | SYSTOLIC BLOOD PRESSURE: 125 MMHG | HEIGHT: 67 IN | WEIGHT: 170 LBS

## 2025-01-15 DIAGNOSIS — Z11.3 ENCOUNTER FOR SCREENING FOR INFECTIONS WITH A PREDOMINANTLY SEXUAL MODE OF TRANSMISSION: ICD-10-CM

## 2025-01-15 DIAGNOSIS — Z01.419 ENCOUNTER FOR GYNECOLOGICAL EXAMINATION (GENERAL) (ROUTINE) W/OUT ABNORMAL FINDINGS: ICD-10-CM

## 2025-01-15 DIAGNOSIS — I49.3 VENTRICULAR PREMATURE DEPOLARIZATION: ICD-10-CM

## 2025-01-15 DIAGNOSIS — Z11.51 ENCOUNTER FOR SCREENING FOR HUMAN PAPILLOMAVIRUS (HPV): ICD-10-CM

## 2025-01-15 PROCEDURE — 99395 PREV VISIT EST AGE 18-39: CPT

## 2025-01-21 LAB — CYTOLOGY CVX/VAG DOC THIN PREP: NORMAL

## 2025-02-12 ENCOUNTER — NON-APPOINTMENT (OUTPATIENT)
Age: 35
End: 2025-02-12